# Patient Record
Sex: FEMALE | Race: WHITE | NOT HISPANIC OR LATINO
[De-identification: names, ages, dates, MRNs, and addresses within clinical notes are randomized per-mention and may not be internally consistent; named-entity substitution may affect disease eponyms.]

---

## 2017-01-03 ENCOUNTER — APPOINTMENT (OUTPATIENT)
Dept: GERIATRICS | Facility: CLINIC | Age: 72
End: 2017-01-03

## 2017-01-03 VITALS
DIASTOLIC BLOOD PRESSURE: 74 MMHG | OXYGEN SATURATION: 98 % | HEART RATE: 76 BPM | RESPIRATION RATE: 17 BRPM | HEIGHT: 64 IN | SYSTOLIC BLOOD PRESSURE: 140 MMHG | WEIGHT: 171 LBS | TEMPERATURE: 98.8 F | BODY MASS INDEX: 29.19 KG/M2

## 2017-01-05 LAB
25(OH)D3 SERPL-MCNC: 23.3 NG/ML
ALBUMIN SERPL ELPH-MCNC: 4.2 G/DL
ALP BLD-CCNC: 82 U/L
ALT SERPL-CCNC: 11 U/L
ANION GAP SERPL CALC-SCNC: 16 MMOL/L
AST SERPL-CCNC: 16 U/L
BASOPHILS # BLD AUTO: 0.02 K/UL
BASOPHILS NFR BLD AUTO: 0.2 %
BILIRUB SERPL-MCNC: 0.4 MG/DL
BUN SERPL-MCNC: 19 MG/DL
CALCIUM SERPL-MCNC: 9.7 MG/DL
CHLORIDE SERPL-SCNC: 105 MMOL/L
CHOLEST SERPL-MCNC: 328 MG/DL
CHOLEST/HDLC SERPL: 6.7 RATIO
CO2 SERPL-SCNC: 24 MMOL/L
CREAT SERPL-MCNC: 1.19 MG/DL
EOSINOPHIL # BLD AUTO: 0.3 K/UL
EOSINOPHIL NFR BLD AUTO: 3.6 %
FOLATE SERPL-MCNC: >20 NG/ML
GLUCOSE SERPL-MCNC: 86 MG/DL
HCT VFR BLD CALC: 40.2 %
HDLC SERPL-MCNC: 49 MG/DL
HGB BLD-MCNC: 13 G/DL
IMM GRANULOCYTES NFR BLD AUTO: 0.2 %
LDLC SERPL CALC-MCNC: 247 MG/DL
LYMPHOCYTES # BLD AUTO: 2.21 K/UL
LYMPHOCYTES NFR BLD AUTO: 26.3 %
MAN DIFF?: NORMAL
MCHC RBC-ENTMCNC: 30.7 PG
MCHC RBC-ENTMCNC: 32.3 GM/DL
MCV RBC AUTO: 94.8 FL
MONOCYTES # BLD AUTO: 0.74 K/UL
MONOCYTES NFR BLD AUTO: 8.8 %
NEUTROPHILS # BLD AUTO: 5.11 K/UL
NEUTROPHILS NFR BLD AUTO: 60.9 %
PLATELET # BLD AUTO: 297 K/UL
POTASSIUM SERPL-SCNC: 4.4 MMOL/L
PROT SERPL-MCNC: 7.1 G/DL
RBC # BLD: 4.24 M/UL
RBC # FLD: 14.7 %
SODIUM SERPL-SCNC: 145 MMOL/L
TRIGL SERPL-MCNC: 159 MG/DL
TSH SERPL-ACNC: 1.35 UIU/ML
VIT B12 SERPL-MCNC: 445 PG/ML
WBC # FLD AUTO: 8.4 K/UL

## 2017-03-16 ENCOUNTER — APPOINTMENT (OUTPATIENT)
Dept: GERIATRICS | Facility: CLINIC | Age: 72
End: 2017-03-16

## 2017-03-16 VITALS
OXYGEN SATURATION: 96 % | DIASTOLIC BLOOD PRESSURE: 70 MMHG | HEIGHT: 64 IN | SYSTOLIC BLOOD PRESSURE: 120 MMHG | WEIGHT: 174 LBS | TEMPERATURE: 97.9 F | HEART RATE: 72 BPM | RESPIRATION RATE: 16 BRPM | BODY MASS INDEX: 29.71 KG/M2

## 2017-05-02 ENCOUNTER — RX RENEWAL (OUTPATIENT)
Age: 72
End: 2017-05-02

## 2017-05-30 ENCOUNTER — APPOINTMENT (OUTPATIENT)
Dept: GERIATRICS | Facility: CLINIC | Age: 72
End: 2017-05-30

## 2017-05-30 VITALS
WEIGHT: 181 LBS | RESPIRATION RATE: 16 BRPM | DIASTOLIC BLOOD PRESSURE: 58 MMHG | BODY MASS INDEX: 30.9 KG/M2 | TEMPERATURE: 98.3 F | SYSTOLIC BLOOD PRESSURE: 110 MMHG | HEIGHT: 64 IN | OXYGEN SATURATION: 97 % | HEART RATE: 70 BPM

## 2017-05-30 DIAGNOSIS — Z23 ENCOUNTER FOR IMMUNIZATION: ICD-10-CM

## 2017-05-30 DIAGNOSIS — Z63.4 ADJUSTMENT DISORDER, UNSPECIFIED: ICD-10-CM

## 2017-05-30 DIAGNOSIS — Z86.19 PERSONAL HISTORY OF OTHER INFECTIOUS AND PARASITIC DISEASES: ICD-10-CM

## 2017-05-30 DIAGNOSIS — F43.20 ADJUSTMENT DISORDER, UNSPECIFIED: ICD-10-CM

## 2017-05-30 RX ORDER — ALPRAZOLAM 0.25 MG/1
0.25 TABLET ORAL
Qty: 30 | Refills: 0 | Status: DISCONTINUED | COMMUNITY
Start: 2017-01-03 | End: 2017-05-30

## 2017-05-30 SDOH — SOCIAL STABILITY - SOCIAL INSECURITY: DISSAPEARANCE AND DEATH OF FAMILY MEMBER: Z63.4

## 2017-06-02 LAB
25(OH)D3 SERPL-MCNC: 24.1 NG/ML
CHOLEST SERPL-MCNC: 232 MG/DL
CHOLEST/HDLC SERPL: 4.5 RATIO
HDLC SERPL-MCNC: 52 MG/DL
LDLC SERPL CALC-MCNC: NORMAL
TRIGL SERPL-MCNC: 481 MG/DL

## 2017-06-08 ENCOUNTER — RX RENEWAL (OUTPATIENT)
Age: 72
End: 2017-06-08

## 2017-07-11 ENCOUNTER — RX RENEWAL (OUTPATIENT)
Age: 72
End: 2017-07-11

## 2017-08-08 ENCOUNTER — RX RENEWAL (OUTPATIENT)
Age: 72
End: 2017-08-08

## 2017-08-10 ENCOUNTER — RX RENEWAL (OUTPATIENT)
Age: 72
End: 2017-08-10

## 2017-08-11 ENCOUNTER — RX RENEWAL (OUTPATIENT)
Age: 72
End: 2017-08-11

## 2017-09-12 ENCOUNTER — RX RENEWAL (OUTPATIENT)
Age: 72
End: 2017-09-12

## 2017-09-26 ENCOUNTER — APPOINTMENT (OUTPATIENT)
Dept: GERIATRICS | Facility: CLINIC | Age: 72
End: 2017-09-26
Payer: MEDICARE

## 2017-09-26 VITALS
TEMPERATURE: 98.7 F | BODY MASS INDEX: 31.28 KG/M2 | SYSTOLIC BLOOD PRESSURE: 120 MMHG | HEART RATE: 69 BPM | WEIGHT: 183.25 LBS | DIASTOLIC BLOOD PRESSURE: 60 MMHG | OXYGEN SATURATION: 96 % | RESPIRATION RATE: 16 BRPM | HEIGHT: 64 IN

## 2017-09-26 DIAGNOSIS — Z12.31 ENCOUNTER FOR SCREENING MAMMOGRAM FOR MALIGNANT NEOPLASM OF BREAST: ICD-10-CM

## 2017-09-26 PROCEDURE — 99214 OFFICE O/P EST MOD 30 MIN: CPT | Mod: 25,GC

## 2017-09-26 PROCEDURE — G0008: CPT

## 2017-09-26 PROCEDURE — 90662 IIV NO PRSV INCREASED AG IM: CPT

## 2017-09-26 RX ORDER — PREDNISONE 5 MG/1
5 TABLET ORAL DAILY
Qty: 30 | Refills: 0 | Status: DISCONTINUED | COMMUNITY
Start: 2017-03-16 | End: 2017-09-26

## 2017-11-02 ENCOUNTER — RX RENEWAL (OUTPATIENT)
Age: 72
End: 2017-11-02

## 2017-11-15 ENCOUNTER — MEDICATION RENEWAL (OUTPATIENT)
Age: 72
End: 2017-11-15

## 2017-12-12 ENCOUNTER — APPOINTMENT (OUTPATIENT)
Dept: GERIATRICS | Facility: CLINIC | Age: 72
End: 2017-12-12
Payer: MEDICARE

## 2017-12-12 VITALS
TEMPERATURE: 98.8 F | HEART RATE: 65 BPM | OXYGEN SATURATION: 98 % | RESPIRATION RATE: 15 BRPM | HEIGHT: 64 IN | BODY MASS INDEX: 31.41 KG/M2 | DIASTOLIC BLOOD PRESSURE: 70 MMHG | WEIGHT: 184 LBS | SYSTOLIC BLOOD PRESSURE: 130 MMHG

## 2017-12-12 PROCEDURE — 99215 OFFICE O/P EST HI 40 MIN: CPT | Mod: 25,GC

## 2017-12-12 PROCEDURE — G0444 DEPRESSION SCREEN ANNUAL: CPT | Mod: 59

## 2018-01-01 ENCOUNTER — EMERGENCY (EMERGENCY)
Facility: HOSPITAL | Age: 73
LOS: 1 days | Discharge: HOME | End: 2018-01-01
Attending: EMERGENCY MEDICINE | Admitting: EMERGENCY MEDICINE
Payer: MEDICARE

## 2018-01-01 VITALS
SYSTOLIC BLOOD PRESSURE: 178 MMHG | OXYGEN SATURATION: 98 % | TEMPERATURE: 99 F | RESPIRATION RATE: 17 BRPM | HEART RATE: 89 BPM | DIASTOLIC BLOOD PRESSURE: 80 MMHG

## 2018-01-01 DIAGNOSIS — I82.402 ACUTE EMBOLISM AND THROMBOSIS OF UNSPECIFIED DEEP VEINS OF LEFT LOWER EXTREMITY: ICD-10-CM

## 2018-01-01 DIAGNOSIS — I10 ESSENTIAL (PRIMARY) HYPERTENSION: ICD-10-CM

## 2018-01-01 DIAGNOSIS — R52 PAIN, UNSPECIFIED: ICD-10-CM

## 2018-01-01 DIAGNOSIS — I82.4Y2 ACUTE EMBOLISM AND THROMBOSIS OF UNSPECIFIED DEEP VEINS OF LEFT PROXIMAL LOWER EXTREMITY: ICD-10-CM

## 2018-01-01 DIAGNOSIS — G35 MULTIPLE SCLEROSIS: ICD-10-CM

## 2018-01-01 DIAGNOSIS — N17.9 ACUTE KIDNEY FAILURE, UNSPECIFIED: ICD-10-CM

## 2018-01-01 DIAGNOSIS — R60.0 LOCALIZED EDEMA: ICD-10-CM

## 2018-01-01 DIAGNOSIS — Z79.899 OTHER LONG TERM (CURRENT) DRUG THERAPY: ICD-10-CM

## 2018-01-01 DIAGNOSIS — Z29.9 ENCOUNTER FOR PROPHYLACTIC MEASURES, UNSPECIFIED: ICD-10-CM

## 2018-01-01 DIAGNOSIS — I82.409 ACUTE EMBOLISM AND THROMBOSIS OF UNSPECIFIED DEEP VEINS OF UNSPECIFIED LOWER EXTREMITY: ICD-10-CM

## 2018-01-01 DIAGNOSIS — Z79.52 LONG TERM (CURRENT) USE OF SYSTEMIC STEROIDS: ICD-10-CM

## 2018-01-01 LAB
ALBUMIN SERPL ELPH-MCNC: 3.7 G/DL — SIGNIFICANT CHANGE UP (ref 3.3–5)
ALP SERPL-CCNC: 65 U/L — SIGNIFICANT CHANGE UP (ref 40–120)
ALT FLD-CCNC: 22 U/L RC — SIGNIFICANT CHANGE UP (ref 10–45)
ANION GAP SERPL CALC-SCNC: 13 MMOL/L — SIGNIFICANT CHANGE UP (ref 5–17)
APTT BLD: 24.2 SEC — LOW (ref 27.5–37.4)
AST SERPL-CCNC: 20 U/L — SIGNIFICANT CHANGE UP (ref 10–40)
BASE EXCESS BLDV CALC-SCNC: 1 MMOL/L — SIGNIFICANT CHANGE UP (ref -2–2)
BASOPHILS # BLD AUTO: 0 K/UL — SIGNIFICANT CHANGE UP (ref 0–0.2)
BASOPHILS NFR BLD AUTO: 0.2 % — SIGNIFICANT CHANGE UP (ref 0–2)
BILIRUB SERPL-MCNC: 0.4 MG/DL — SIGNIFICANT CHANGE UP (ref 0.2–1.2)
BUN SERPL-MCNC: 21 MG/DL — SIGNIFICANT CHANGE UP (ref 7–23)
CA-I SERPL-SCNC: 1.16 MMOL/L — SIGNIFICANT CHANGE UP (ref 1.12–1.3)
CALCIUM SERPL-MCNC: 9.1 MG/DL — SIGNIFICANT CHANGE UP (ref 8.4–10.5)
CHLORIDE BLDV-SCNC: 114 MMOL/L — HIGH (ref 96–108)
CHLORIDE SERPL-SCNC: 108 MMOL/L — SIGNIFICANT CHANGE UP (ref 96–108)
CO2 BLDV-SCNC: 27 MMOL/L — SIGNIFICANT CHANGE UP (ref 22–30)
CO2 SERPL-SCNC: 25 MMOL/L — SIGNIFICANT CHANGE UP (ref 22–31)
CREAT SERPL-MCNC: 1.39 MG/DL — HIGH (ref 0.5–1.3)
EOSINOPHIL # BLD AUTO: 0.3 K/UL — SIGNIFICANT CHANGE UP (ref 0–0.5)
EOSINOPHIL NFR BLD AUTO: 3.4 % — SIGNIFICANT CHANGE UP (ref 0–6)
GAS PNL BLDV: 141 MMOL/L — SIGNIFICANT CHANGE UP (ref 136–145)
GAS PNL BLDV: SIGNIFICANT CHANGE UP
GAS PNL BLDV: SIGNIFICANT CHANGE UP
GLUCOSE BLDV-MCNC: 98 MG/DL — SIGNIFICANT CHANGE UP (ref 70–99)
GLUCOSE SERPL-MCNC: 96 MG/DL — SIGNIFICANT CHANGE UP (ref 70–99)
HCO3 BLDV-SCNC: 26 MMOL/L — SIGNIFICANT CHANGE UP (ref 21–29)
HCT VFR BLD CALC: 38.1 % — SIGNIFICANT CHANGE UP (ref 34.5–45)
HCT VFR BLDA CALC: 41 % — SIGNIFICANT CHANGE UP (ref 39–50)
HGB BLD CALC-MCNC: 13.4 G/DL — SIGNIFICANT CHANGE UP (ref 11.5–15.5)
HGB BLD-MCNC: 12.8 G/DL — SIGNIFICANT CHANGE UP (ref 11.5–15.5)
HOROWITZ INDEX BLDV+IHG-RTO: SIGNIFICANT CHANGE UP
INR BLD: 1.08 RATIO — SIGNIFICANT CHANGE UP (ref 0.88–1.16)
LACTATE BLDV-MCNC: 2 MMOL/L — SIGNIFICANT CHANGE UP (ref 0.7–2)
LYMPHOCYTES # BLD AUTO: 0.8 K/UL — LOW (ref 1–3.3)
LYMPHOCYTES # BLD AUTO: 8.4 % — LOW (ref 13–44)
MCHC RBC-ENTMCNC: 32.2 PG — SIGNIFICANT CHANGE UP (ref 27–34)
MCHC RBC-ENTMCNC: 33.5 GM/DL — SIGNIFICANT CHANGE UP (ref 32–36)
MCV RBC AUTO: 96.2 FL — SIGNIFICANT CHANGE UP (ref 80–100)
MONOCYTES # BLD AUTO: 0.8 K/UL — SIGNIFICANT CHANGE UP (ref 0–0.9)
MONOCYTES NFR BLD AUTO: 8.6 % — SIGNIFICANT CHANGE UP (ref 2–14)
NEUTROPHILS # BLD AUTO: 7.3 K/UL — SIGNIFICANT CHANGE UP (ref 1.8–7.4)
NEUTROPHILS NFR BLD AUTO: 79.5 % — HIGH (ref 43–77)
PCO2 BLDV: 44 MMHG — SIGNIFICANT CHANGE UP (ref 35–50)
PH BLDV: 7.39 — SIGNIFICANT CHANGE UP (ref 7.35–7.45)
PLATELET # BLD AUTO: 222 K/UL — SIGNIFICANT CHANGE UP (ref 150–400)
PO2 BLDV: 32 MMHG — SIGNIFICANT CHANGE UP (ref 25–45)
POTASSIUM BLDV-SCNC: 5.1 MMOL/L — HIGH (ref 3.5–5)
POTASSIUM SERPL-MCNC: 4.5 MMOL/L — SIGNIFICANT CHANGE UP (ref 3.5–5.3)
POTASSIUM SERPL-SCNC: 4.5 MMOL/L — SIGNIFICANT CHANGE UP (ref 3.5–5.3)
PROT SERPL-MCNC: 7.2 G/DL — SIGNIFICANT CHANGE UP (ref 6–8.3)
PROTHROM AB SERPL-ACNC: 11.8 SEC — SIGNIFICANT CHANGE UP (ref 9.8–12.7)
RBC # BLD: 3.96 M/UL — SIGNIFICANT CHANGE UP (ref 3.8–5.2)
RBC # FLD: 13.1 % — SIGNIFICANT CHANGE UP (ref 10.3–14.5)
SAO2 % BLDV: 50 % — LOW (ref 67–88)
SODIUM SERPL-SCNC: 146 MMOL/L — HIGH (ref 135–145)
WBC # BLD: 9.1 K/UL — SIGNIFICANT CHANGE UP (ref 3.8–10.5)
WBC # FLD AUTO: 9.1 K/UL — SIGNIFICANT CHANGE UP (ref 3.8–10.5)

## 2018-01-01 RX ORDER — HEPARIN SODIUM 5000 [USP'U]/ML
INJECTION INTRAVENOUS; SUBCUTANEOUS
Qty: 25000 | Refills: 0 | Status: DISCONTINUED | OUTPATIENT
Start: 2018-01-01 | End: 2018-01-01

## 2018-01-01 RX ORDER — HEPARIN SODIUM 5000 [USP'U]/ML
6500 INJECTION INTRAVENOUS; SUBCUTANEOUS EVERY 6 HOURS
Qty: 0 | Refills: 0 | Status: DISCONTINUED | OUTPATIENT
Start: 2018-01-01 | End: 2018-01-02

## 2018-01-01 RX ORDER — OXYCODONE AND ACETAMINOPHEN 5; 325 MG/1; MG/1
1 TABLET ORAL ONCE
Qty: 0 | Refills: 0 | Status: DISCONTINUED | OUTPATIENT
Start: 2018-01-01 | End: 2018-01-01

## 2018-01-01 RX ORDER — CHOLECALCIFEROL (VITAMIN D3) 125 MCG
1000 CAPSULE ORAL DAILY
Qty: 0 | Refills: 0 | Status: DISCONTINUED | OUTPATIENT
Start: 2018-01-01 | End: 2018-01-02

## 2018-01-01 RX ORDER — SENNA PLUS 8.6 MG/1
2 TABLET ORAL AT BEDTIME
Qty: 0 | Refills: 0 | Status: DISCONTINUED | OUTPATIENT
Start: 2018-01-01 | End: 2018-01-02

## 2018-01-01 RX ORDER — OXYCODONE AND ACETAMINOPHEN 5; 325 MG/1; MG/1
1 TABLET ORAL EVERY 4 HOURS
Qty: 0 | Refills: 0 | Status: DISCONTINUED | OUTPATIENT
Start: 2018-01-01 | End: 2018-01-02

## 2018-01-01 RX ORDER — DOCUSATE SODIUM 100 MG
100 CAPSULE ORAL THREE TIMES A DAY
Qty: 0 | Refills: 0 | Status: DISCONTINUED | OUTPATIENT
Start: 2018-01-01 | End: 2018-01-02

## 2018-01-01 RX ORDER — HEPARIN SODIUM 5000 [USP'U]/ML
6500 INJECTION INTRAVENOUS; SUBCUTANEOUS EVERY 6 HOURS
Qty: 0 | Refills: 0 | Status: DISCONTINUED | OUTPATIENT
Start: 2018-01-01 | End: 2018-01-01

## 2018-01-01 RX ORDER — MORPHINE SULFATE 50 MG/1
2 CAPSULE, EXTENDED RELEASE ORAL EVERY 6 HOURS
Qty: 0 | Refills: 0 | Status: DISCONTINUED | OUTPATIENT
Start: 2018-01-01 | End: 2018-01-02

## 2018-01-01 RX ORDER — ACETAMINOPHEN 500 MG
650 TABLET ORAL EVERY 6 HOURS
Qty: 0 | Refills: 0 | Status: DISCONTINUED | OUTPATIENT
Start: 2018-01-01 | End: 2018-01-02

## 2018-01-01 RX ORDER — HEPARIN SODIUM 5000 [USP'U]/ML
6500 INJECTION INTRAVENOUS; SUBCUTANEOUS ONCE
Qty: 0 | Refills: 0 | Status: DISCONTINUED | OUTPATIENT
Start: 2018-01-01 | End: 2018-01-01

## 2018-01-01 RX ORDER — HEPARIN SODIUM 5000 [USP'U]/ML
6500 INJECTION INTRAVENOUS; SUBCUTANEOUS ONCE
Qty: 0 | Refills: 0 | Status: COMPLETED | OUTPATIENT
Start: 2018-01-01 | End: 2018-01-01

## 2018-01-01 RX ORDER — HEPARIN SODIUM 5000 [USP'U]/ML
3000 INJECTION INTRAVENOUS; SUBCUTANEOUS EVERY 6 HOURS
Qty: 0 | Refills: 0 | Status: DISCONTINUED | OUTPATIENT
Start: 2018-01-01 | End: 2018-01-01

## 2018-01-01 RX ORDER — HEPARIN SODIUM 5000 [USP'U]/ML
3000 INJECTION INTRAVENOUS; SUBCUTANEOUS EVERY 6 HOURS
Qty: 0 | Refills: 0 | Status: DISCONTINUED | OUTPATIENT
Start: 2018-01-01 | End: 2018-01-02

## 2018-01-01 RX ORDER — HEPARIN SODIUM 5000 [USP'U]/ML
INJECTION INTRAVENOUS; SUBCUTANEOUS
Qty: 25000 | Refills: 0 | Status: DISCONTINUED | OUTPATIENT
Start: 2018-01-01 | End: 2018-01-02

## 2018-01-01 RX ORDER — METOPROLOL TARTRATE 50 MG
50 TABLET ORAL DAILY
Qty: 0 | Refills: 0 | Status: DISCONTINUED | OUTPATIENT
Start: 2018-01-01 | End: 2018-01-02

## 2018-01-01 RX ADMIN — Medication 50 MILLIGRAM(S): at 21:59

## 2018-01-01 RX ADMIN — OXYCODONE AND ACETAMINOPHEN 1 TABLET(S): 5; 325 TABLET ORAL at 13:00

## 2018-01-01 RX ADMIN — OXYCODONE AND ACETAMINOPHEN 1 TABLET(S): 5; 325 TABLET ORAL at 17:54

## 2018-01-01 RX ADMIN — HEPARIN SODIUM 6500 UNIT(S): 5000 INJECTION INTRAVENOUS; SUBCUTANEOUS at 18:03

## 2018-01-01 RX ADMIN — Medication 100 MILLIGRAM(S): at 21:59

## 2018-01-01 RX ADMIN — OXYCODONE AND ACETAMINOPHEN 1 TABLET(S): 5; 325 TABLET ORAL at 12:34

## 2018-01-01 RX ADMIN — HEPARIN SODIUM 1500 UNIT(S)/HR: 5000 INJECTION INTRAVENOUS; SUBCUTANEOUS at 18:04

## 2018-01-01 NOTE — ED PROVIDER NOTE - OBJECTIVE STATEMENT
72 year old female w/ PMH of HTN, MS presents c/o LLE edema and pain since this morning. Patient w/ DVT 30 years ago, not on any anti-coagulation. States that when she woke up noticed that swelling associated w/ 10/10 pain w/ ambulation and chills. Patient denies recent travel, hormonal therapy, fevers, chest pain, sob, abdominal pain, n/v.

## 2018-01-01 NOTE — H&P ADULT - PROBLEM SELECTOR PLAN 5
on glatimer acetate injections MWF  - check vit d level; start vit d 1000 iu daily as vit d shown to decr MS relapses  - If pt is still in hospital Wednesday, would need pt to self-inject  - c/w home prednisone 10 mg qd (has been on for 6 weeks) on glatimer acetate injections MWF  - check vit d level; start vit d 1000 iu daily as vit d shown to decr MS relapses  - If pt is still in hospital Wednesday, would need pt to self-inject glatimer  - c/w home prednisone 10 mg qd (has been on for 6 weeks)

## 2018-01-01 NOTE — H&P ADULT - PROBLEM SELECTOR PLAN 4
elevated BP read in ED, with hx of HTN. May be 2/2 pain.  - recheck BP  - c/w home toprol XL 50 mg once daily

## 2018-01-01 NOTE — ED PROVIDER NOTE - PROGRESS NOTE DETAILS
Spoke to Dr. Love fellow who states patient Spoke to Dr. Love fellow regarding findings. States that patient should be admitted for inpatient anti-coagulation. Patient w/ recent abnl protein electrophoresis, requests that heme be consulted as an inpatient  Penny Payne PA-C

## 2018-01-01 NOTE — ED PROVIDER NOTE - ATTENDING CONTRIBUTION TO CARE
72 year old female w/ PMH of HTN, MS presents c/o LLE edema and pain since this morning. Patient w/ DVT 30 years ago with swelling noted and warmth started today with no trauma noted. pos chills. vss, leg with swelling but no skin changes lower leg.

## 2018-01-01 NOTE — H&P ADULT - PROBLEM SELECTOR PLAN 3
Cr 1.4 - CHADWICK vs underlying CKD.  - obtain baseline Cr from outpt office  - monitor bmp  - renally dose meds

## 2018-01-01 NOTE — H&P ADULT - PROBLEM SELECTOR PLAN 6
DVT tx as above  PT/OT consult  DISPO: likely d/c tomorrow 1/2 pending heme recs, pharmacy confirmation of oral a/c  PCP: Dr Marino emailed regarding admission.

## 2018-01-01 NOTE — H&P ADULT - PROBLEM SELECTOR PLAN 2
Chronic pain with opioid dependence. ISTOP verified home meds #: [id number].  - c/w home lyrica 50 mg q12h  - c/w home percocet 1 tab q8h prn mod pain  - morphine 2 mg iv q6h prn severe pain given LLE pain  - bowel regimen (senna/colace) while on opioids

## 2018-01-01 NOTE — H&P ADULT - FAMILY HISTORY
Mother  Still living? No  Family history of DVT, Age at diagnosis: Age Unknown     Father  Still living? No  Family history of heart disease, Age at diagnosis: Age Unknown

## 2018-01-01 NOTE — H&P ADULT - PMH
Acute deep vein thrombosis (DVT) of lower extremity, unspecified laterality, unspecified vein  30 YEARS AGO  HTN (hypertension)    MS (multiple sclerosis)

## 2018-01-01 NOTE — H&P ADULT - PROBLEM SELECTOR PLAN 1
of LLE, confirmed by doppler. No known inciting factor although does report exercise tolerance limited 2/2 chronic pain and MS. UTD with malignancy screening, however recent protein electrophoresis abn.  - start heparin gtt given CHADWICK; monitor PTT  - called pharmacy [facility] in [place] [phone] CLOSED today. Would f/u tomorrow regarding if apixaban covered by insurance as would be good outpt regimen in setting of elevated Cr.  - no respiratory distress - would not check CTA as won't   - f/u outpt records regarding protein electrophoresis  - check spep/upep  - consult hematology in the AM as this is 2nd DVT (nonprovoked) and to consider testing for coagulopathy (antithrombin, prothrombin, factor v leidin, antiphospholipid, protein c and s)  - pain control as below

## 2018-01-01 NOTE — H&P ADULT - NSHPREVIEWOFSYSTEMS_GEN_ALL_CORE
Review of Systems:   CONSTITUTIONAL: +CHILLS. No weight loss  EYES: No eye pain, visual disturbances, or discharge  ENMT:  No difficulty hearing, tinnitus, vertigo; No sinus or throat pain  RESPIRATORY: No SOB. No cough, wheezing, chills or hemoptysis  CARDIOVASCULAR: +LEFT LEG SWELLING. No chest pain, palpitations, dizziness.  GASTROINTESTINAL: No abdominal or epigastric pain. No nausea, vomiting, or hematemesis; No diarrhea or constipation. No melena or hematochezia.  GENITOURINARY: No dysuria, frequency, hematuria, or incontinence  NEUROLOGICAL: No headaches, memory loss, loss of strength, numbness, or tremors  SKIN: +REDNESS OF LEFT LEG. No itching, burning, lesions   LYMPH NODES: No enlarged glands  ENDOCRINE: No heat or cold intolerance; No hair loss  MUSCULOSKELETAL: No joint pain or swelling  PSYCHIATRIC: No depression, anxiety, mood swings, or difficulty sleeping  HEME/LYMPH: No easy bruising, or bleeding gums

## 2018-01-01 NOTE — ED PROVIDER NOTE - PHYSICAL EXAMINATION
CONSTITUTIONAL: Patient is awake, alert and oriented x 3. Patient is well appearing and in no acute distress.  HEAD: NCAT,   NECK: supple, No LAD,  LUNGS: CTA B/L,  HEART: RRR.+S1S2 no murmurs,   ABDOMEN: Soft nd/nt+bs no rebound or guarding.   EXTREMITY: Excorations to the skin of the lle, +non-pitting edema to the lle with calf tenderness.  FROM upper and lower ext b/l  NEURO: No focal deficits

## 2018-01-01 NOTE — H&P ADULT - NSHPPHYSICALEXAM_GEN_ALL_CORE
Vital Signs Last 24 Hrs  T(C): 37.1 (01 Jan 2018 10:40), Max: 37.1 (01 Jan 2018 10:40)  T(F): 98.7 (01 Jan 2018 10:40), Max: 98.7 (01 Jan 2018 10:40)  HR: 89 (01 Jan 2018 10:40) (89 - 89)  BP: 178/80 (01 Jan 2018 10:40) (178/80 - 178/80)  BP(mean): --  RR: 17 (01 Jan 2018 10:40) (17 - 17)  SpO2: 98% (01 Jan 2018 10:40) (98% - 98%)    PHYSICAL EXAM:  GENERAL:  Well appearing, obese F, lying in stretcher, in NAD  HEAD:  NCAT  EYES: PERRLA  NECK: Supple, No JVD  CHEST/LUNG: CTA B/L. No w/r/r.  HEART: RRR. Normal S1, S2. No m/r/g.   ABDOMEN: SNTND. Bowel sounds present  EXTREMITIES:  LLE enlarged w/ 1+ pitting edema, TTP, with associated erythematous vertical striae. 2+ Peripheral Pulses, No clubbing, cyanosis.  PSYCH: AAOx3, appropriate affect  SKIN: striae distensae of LLE

## 2018-01-01 NOTE — ED ADULT NURSE NOTE - CHPI ED SYMPTOMS NEG
no nausea/no tingling/no vomiting/no dizziness/no chills/no fever/no weakness/no numbness/no decreased eating/drinking

## 2018-01-01 NOTE — ED ADULT NURSE REASSESSMENT NOTE - NS ED NURSE REASSESS COMMENT FT1
Attempted to give report to 3 cheri RN. person answering phone states "room is dirty", placed me on hold for 7 minutes waiting for RN, picked up and then hung up. Charge RN aware.

## 2018-01-01 NOTE — ED ADULT NURSE NOTE - OBJECTIVE STATEMENT
72 year old female presents to ED with pain and swelling in left lower extremity. History of MS & HTN. states pain and swelling came on suddenly last night. Denies HA, CP, SOB, abd pain, NVD, fevers, chills, dizziness, cough, recent falls or travel. Leg is swollen, red. Patient undressed and placed into gown, call bell in hand and side rails up with bed in lowest position for safety. blanket provided. Comfort and safety provided.

## 2018-01-01 NOTE — H&P ADULT - ASSESSMENT
71 y/o F PMH of HTN, multiple sclerosis on glatimer acetate, chronic pain on opioids, Hx of provoked DVT 30 yrs ago completed coumadin presents for 1 day of LLE edema/pain found to have acute LLE femoral DVT. Noted to have elevated Cr.

## 2018-01-01 NOTE — H&P ADULT - NSHPLABSRESULTS_GEN_ALL_CORE
LABS:                         12.8   9.1   )-----------( 222      ( 01 Jan 2018 12:01 )             38.1     01-01    146<H>  |  108  |  21  ----------------------------<  96  4.5   |  25  |  1.39<H>    Ca    9.1      01 Jan 2018 12:01    TPro  7.2  /  Alb  3.7  /  TBili  0.4  /  DBili  x   /  AST  20  /  ALT  22  /  AlkPhos  65  01-01    PT/INR - ( 01 Jan 2018 12:01 )   PT: 11.8 sec;   INR: 1.08 ratio         PTT - ( 01 Jan 2018 12:01 )  PTT:24.2 sec    Labs reviewed remarkable for Cr 1.39, none prior to compare. Mild hyperNa.  Doppler of LLE - acute LLE DVT from mid femoral vein through popliteal and calf vein.

## 2018-01-02 ENCOUNTER — TRANSCRIPTION ENCOUNTER (OUTPATIENT)
Age: 73
End: 2018-01-02

## 2018-01-02 VITALS
DIASTOLIC BLOOD PRESSURE: 89 MMHG | SYSTOLIC BLOOD PRESSURE: 141 MMHG | OXYGEN SATURATION: 98 % | TEMPERATURE: 98 F | HEART RATE: 72 BPM | RESPIRATION RATE: 18 BRPM

## 2018-01-02 LAB
24R-OH-CALCIDIOL SERPL-MCNC: 34.8 NG/ML — SIGNIFICANT CHANGE UP (ref 30–80)
ANION GAP SERPL CALC-SCNC: 13 MMOL/L — SIGNIFICANT CHANGE UP (ref 5–17)
APTT BLD: 159.2 SEC — CRITICAL HIGH (ref 27.5–37.4)
APTT BLD: 91.5 SEC — HIGH (ref 27.5–37.4)
BASOPHILS # BLD AUTO: 0.03 K/UL — SIGNIFICANT CHANGE UP (ref 0–0.2)
BASOPHILS NFR BLD AUTO: 0.3 % — SIGNIFICANT CHANGE UP (ref 0–2)
BUN SERPL-MCNC: 21 MG/DL — SIGNIFICANT CHANGE UP (ref 7–23)
CALCIUM SERPL-MCNC: 9 MG/DL — SIGNIFICANT CHANGE UP (ref 8.4–10.5)
CHLORIDE SERPL-SCNC: 106 MMOL/L — SIGNIFICANT CHANGE UP (ref 96–108)
CO2 SERPL-SCNC: 26 MMOL/L — SIGNIFICANT CHANGE UP (ref 22–31)
CREAT SERPL-MCNC: 1.25 MG/DL — SIGNIFICANT CHANGE UP (ref 0.5–1.3)
EOSINOPHIL # BLD AUTO: 0.66 K/UL — HIGH (ref 0–0.5)
EOSINOPHIL NFR BLD AUTO: 6.7 % — HIGH (ref 0–6)
GLUCOSE SERPL-MCNC: 87 MG/DL — SIGNIFICANT CHANGE UP (ref 70–99)
HCT VFR BLD CALC: 34.8 % — SIGNIFICANT CHANGE UP (ref 34.5–45)
HCT VFR BLD CALC: 36.7 % — SIGNIFICANT CHANGE UP (ref 34.5–45)
HGB BLD-MCNC: 11.7 G/DL — SIGNIFICANT CHANGE UP (ref 11.5–15.5)
HGB BLD-MCNC: 11.9 G/DL — SIGNIFICANT CHANGE UP (ref 11.5–15.5)
IMM GRANULOCYTES NFR BLD AUTO: 0.3 % — SIGNIFICANT CHANGE UP (ref 0–1.5)
LYMPHOCYTES # BLD AUTO: 1.5 K/UL — SIGNIFICANT CHANGE UP (ref 1–3.3)
LYMPHOCYTES # BLD AUTO: 15.2 % — SIGNIFICANT CHANGE UP (ref 13–44)
MAGNESIUM SERPL-MCNC: 2.4 MG/DL — SIGNIFICANT CHANGE UP (ref 1.6–2.6)
MCHC RBC-ENTMCNC: 31.1 PG — SIGNIFICANT CHANGE UP (ref 27–34)
MCHC RBC-ENTMCNC: 32.3 PG — SIGNIFICANT CHANGE UP (ref 27–34)
MCHC RBC-ENTMCNC: 32.4 GM/DL — SIGNIFICANT CHANGE UP (ref 32–36)
MCHC RBC-ENTMCNC: 33.5 GM/DL — SIGNIFICANT CHANGE UP (ref 32–36)
MCV RBC AUTO: 95.8 FL — SIGNIFICANT CHANGE UP (ref 80–100)
MCV RBC AUTO: 96.6 FL — SIGNIFICANT CHANGE UP (ref 80–100)
MONOCYTES # BLD AUTO: 0.89 K/UL — SIGNIFICANT CHANGE UP (ref 0–0.9)
MONOCYTES NFR BLD AUTO: 9 % — SIGNIFICANT CHANGE UP (ref 2–14)
NEUTROPHILS # BLD AUTO: 6.74 K/UL — SIGNIFICANT CHANGE UP (ref 1.8–7.4)
NEUTROPHILS NFR BLD AUTO: 68.5 % — SIGNIFICANT CHANGE UP (ref 43–77)
PLATELET # BLD AUTO: 222 K/UL — SIGNIFICANT CHANGE UP (ref 150–400)
PLATELET # BLD AUTO: 256 K/UL — SIGNIFICANT CHANGE UP (ref 150–400)
POTASSIUM SERPL-MCNC: 4 MMOL/L — SIGNIFICANT CHANGE UP (ref 3.5–5.3)
POTASSIUM SERPL-SCNC: 4 MMOL/L — SIGNIFICANT CHANGE UP (ref 3.5–5.3)
PROT SERPL-MCNC: 6.2 G/DL — SIGNIFICANT CHANGE UP (ref 6–8.3)
PROT SERPL-MCNC: 6.2 G/DL — SIGNIFICANT CHANGE UP (ref 6–8.3)
RBC # BLD: 3.6 M/UL — LOW (ref 3.8–5.2)
RBC # BLD: 3.83 M/UL — SIGNIFICANT CHANGE UP (ref 3.8–5.2)
RBC # FLD: 13.2 % — SIGNIFICANT CHANGE UP (ref 10.3–14.5)
RBC # FLD: 14.9 % — HIGH (ref 10.3–14.5)
SODIUM SERPL-SCNC: 145 MMOL/L — SIGNIFICANT CHANGE UP (ref 135–145)
WBC # BLD: 10 K/UL — SIGNIFICANT CHANGE UP (ref 3.8–10.5)
WBC # BLD: 9.85 K/UL — SIGNIFICANT CHANGE UP (ref 3.8–10.5)
WBC # FLD AUTO: 10 K/UL — SIGNIFICANT CHANGE UP (ref 3.8–10.5)
WBC # FLD AUTO: 9.85 K/UL — SIGNIFICANT CHANGE UP (ref 3.8–10.5)

## 2018-01-02 PROCEDURE — 82306 VITAMIN D 25 HYDROXY: CPT

## 2018-01-02 PROCEDURE — 84155 ASSAY OF PROTEIN SERUM: CPT

## 2018-01-02 PROCEDURE — 82803 BLOOD GASES ANY COMBINATION: CPT

## 2018-01-02 PROCEDURE — 82330 ASSAY OF CALCIUM: CPT

## 2018-01-02 PROCEDURE — 80053 COMPREHEN METABOLIC PANEL: CPT

## 2018-01-02 PROCEDURE — 99285 EMERGENCY DEPT VISIT HI MDM: CPT | Mod: 25

## 2018-01-02 PROCEDURE — 85610 PROTHROMBIN TIME: CPT

## 2018-01-02 PROCEDURE — 83735 ASSAY OF MAGNESIUM: CPT

## 2018-01-02 PROCEDURE — 82947 ASSAY GLUCOSE BLOOD QUANT: CPT

## 2018-01-02 PROCEDURE — 85027 COMPLETE CBC AUTOMATED: CPT

## 2018-01-02 PROCEDURE — 85730 THROMBOPLASTIN TIME PARTIAL: CPT

## 2018-01-02 PROCEDURE — 86334 IMMUNOFIX E-PHORESIS SERUM: CPT

## 2018-01-02 PROCEDURE — 85014 HEMATOCRIT: CPT

## 2018-01-02 PROCEDURE — 80048 BASIC METABOLIC PNL TOTAL CA: CPT

## 2018-01-02 PROCEDURE — 84295 ASSAY OF SERUM SODIUM: CPT

## 2018-01-02 PROCEDURE — 84132 ASSAY OF SERUM POTASSIUM: CPT

## 2018-01-02 PROCEDURE — 82435 ASSAY OF BLOOD CHLORIDE: CPT

## 2018-01-02 PROCEDURE — 83605 ASSAY OF LACTIC ACID: CPT

## 2018-01-02 PROCEDURE — 93971 EXTREMITY STUDY: CPT

## 2018-01-02 PROCEDURE — 84165 PROTEIN E-PHORESIS SERUM: CPT

## 2018-01-02 RX ORDER — APIXABAN 2.5 MG/1
2 TABLET, FILM COATED ORAL
Qty: 28 | Refills: 0 | OUTPATIENT
Start: 2018-01-02 | End: 2018-01-08

## 2018-01-02 RX ORDER — CHOLECALCIFEROL (VITAMIN D3) 125 MCG
1000 CAPSULE ORAL
Qty: 0 | Refills: 0 | COMMUNITY
Start: 2018-01-02

## 2018-01-02 RX ADMIN — OXYCODONE AND ACETAMINOPHEN 1 TABLET(S): 5; 325 TABLET ORAL at 01:45

## 2018-01-02 RX ADMIN — Medication 100 MILLIGRAM(S): at 14:26

## 2018-01-02 RX ADMIN — Medication 10 MILLIGRAM(S): at 05:41

## 2018-01-02 RX ADMIN — HEPARIN SODIUM 1200 UNIT(S)/HR: 5000 INJECTION INTRAVENOUS; SUBCUTANEOUS at 01:40

## 2018-01-02 RX ADMIN — HEPARIN SODIUM 0 UNIT(S)/HR: 5000 INJECTION INTRAVENOUS; SUBCUTANEOUS at 00:36

## 2018-01-02 RX ADMIN — HEPARIN SODIUM 1200 UNIT(S)/HR: 5000 INJECTION INTRAVENOUS; SUBCUTANEOUS at 10:31

## 2018-01-02 RX ADMIN — Medication 50 MILLIGRAM(S): at 05:41

## 2018-01-02 RX ADMIN — OXYCODONE AND ACETAMINOPHEN 1 TABLET(S): 5; 325 TABLET ORAL at 11:13

## 2018-01-02 RX ADMIN — Medication 1000 UNIT(S): at 14:26

## 2018-01-02 RX ADMIN — OXYCODONE AND ACETAMINOPHEN 1 TABLET(S): 5; 325 TABLET ORAL at 02:15

## 2018-01-02 RX ADMIN — Medication 100 MILLIGRAM(S): at 05:41

## 2018-01-02 NOTE — PROGRESS NOTE ADULT - PROBLEM SELECTOR PLAN 2
Chronic pain with opioid dependence.   - c/w home lyrica 50 mg q12h  - c/w home percocet 1 tab q8h prn mod pain  - morphine 2 mg iv q6h prn severe pain given LLE pain  - bowel regimen (senna/colace) while on opioids

## 2018-01-02 NOTE — DISCHARGE NOTE ADULT - PATIENT PORTAL LINK FT
“You can access the [facility] Patient Portal, offered by [facility], by registering with the following website: [email or web address]”

## 2018-01-02 NOTE — DISCHARGE NOTE ADULT - HOSPITAL COURSE
md 71 y/o F PMH of HTN, multiple sclerosis on glatimer acetate, chronic pain on opioids, Hx of provoked DVT 30 yrs ago completed coumadin presents for acute onset LLE edema. Pt on 12/31/17  feeling some chills and woke up the day after with sudden onset left leg swelling 3x normal associated with pain with ambulation, and overlying redness. Pt was brought to the ER at [facility]. In the ER venous duplex was done and pt was found to have acute left leg DVT. Pt also was found to have acute on CKD, she was started on IV heparin drip. Pt is clinicaly improving. after dicussion with PCP and pharmacy Pt is discharge on eliquis 10 mg Q 12h for 7 day and 5 mg Q 12 h. Pt has been advise to follow up with her PCP.

## 2018-01-02 NOTE — DISCHARGE NOTE ADULT - PLAN OF CARE
continue with copaxone Tolerates anticoagulation/ free from active bleed Start with [id number] twice a day for 7 days   Follow up with your doctor to decrease dose after 7days to 5mg oral twice a day condition improved Low salt diet.  Activity as tolerated.  Take all medication as prescribed.  Follow up with your medical doctor for routine blood pressure monitoring at your next visit.  Notify your doctor if you have any of the following symptoms:   Dizziness, Lightheadedness, Blurry vision, Headache, Chest pain, Shortness of breath

## 2018-01-02 NOTE — PROGRESS NOTE ADULT - SUBJECTIVE AND OBJECTIVE BOX
Patient is a 72y old  Female who presents with a chief complaint of LLE swelling and pain (01 Jan 2018 17:49)      SUBJECTIVE / OVERNIGHT EVENTS: Pt is seen and examined at bedside alert oriented in NAD. pt states the pain has been improved as well as the swelling in the leg. no new overnight event as per nursinfg staff    ROS: as mention above   All other review of systems negative    Allergies    penicillin (Anaphylaxis)    Intolerances        MEDICATIONS  (STANDING):  cholecalciferol 1000 Unit(s) Oral daily  docusate sodium 100 milliGRAM(s) Oral three times a day  heparin  Infusion.  Unit(s)/Hr (15 mL/Hr) IV Continuous <Continuous>  metoprolol succinate ER 50 milliGRAM(s) Oral daily  predniSONE   Tablet 10 milliGRAM(s) Oral daily  pregabalin 50 milliGRAM(s) Oral two times a day    MEDICATIONS  (PRN):  acetaminophen   Tablet. 650 milliGRAM(s) Oral every 6 hours PRN Mild Pain (1 - 3)  heparin  Injectable 6500 Unit(s) IV Push every 6 hours PRN For aPTT less than 40  heparin  Injectable 3000 Unit(s) IV Push every 6 hours PRN For aPTT between 40 - 57  morphine  - Injectable 2 milliGRAM(s) IV Push every 6 hours PRN Severe Pain (7 - 10)  oxyCODONE    5 mG/acetaminophen 325 mG 1 Tablet(s) Oral every 4 hours PRN Moderate Pain (4 - 6)  senna 2 Tablet(s) Oral at bedtime PRN Constipation      Vital Signs Last 24 Hrs  T(C): 36.7 (02 Jan 2018 11:45), Max: 37.1 (01 Jan 2018 19:17)  T(F): 98 (02 Jan 2018 11:45), Max: 98.8 (01 Jan 2018 19:17)  HR: 72 (02 Jan 2018 11:45) (60 - 80)  BP: 141/89 (02 Jan 2018 11:45) (137/71 - 162/78)  BP(mean): --  RR: 18 (02 Jan 2018 11:45) (16 - 18)  SpO2: 98% (02 Jan 2018 11:45) (96% - 99%)  CAPILLARY BLOOD GLUCOSE        I&O's Summary    01 Jan 2018 07:01  -  02 Jan 2018 07:00  --------------------------------------------------------  IN: 510 mL / OUT: 0 mL / NET: 510 mL    02 Jan 2018 07:01  -  02 Jan 2018 12:05  --------------------------------------------------------  IN: 240 mL / OUT: 0 mL / NET: 240 mL        PHYSICAL EXAM:  GENERAL: alert, oriented X3 , well-developed  HEAD:  Atraumatic, Normocephalic  EYES: EOMI, PERRLA, conjunctiva and sclera clear  NECK: Supple, No JVD  CHEST/LUNG: Clear to auscultation bilaterally; No wheeze  HEART: Regular rate and rhythm; No murmurs, rubs, or gallops  ABDOMEN: Soft, Nontender, Nondistended; Bowel sounds present  EXTREMITIES:  Left leg swelling, tenderness to palpation  NEUROLOGY: AAOx3, non-focal  PSYCH: calm  SKIN: No rashes or lesions    LABS:                        11.9   9.85  )-----------( 256      ( 02 Jan 2018 11:31 )             36.7     01-01    146<H>  |  108  |  21  ----------------------------<  96  4.5   |  25  |  1.39<H>    Ca    9.1      01 Jan 2018 12:01    TPro  7.2  /  Alb  3.7  /  TBili  0.4  /  DBili  x   /  AST  20  /  ALT  22  /  AlkPhos  65  01-01    PT/INR - ( 01 Jan 2018 12:01 )   PT: 11.8 sec;   INR: 1.08 ratio         PTT - ( 02 Jan 2018 09:14 )  PTT:91.5 sec          RADIOLOGY & ADDITIONAL TESTS:    Imaging Personally Reviewed:    Consultant(s) Notes Reviewed:      Care Discussed with Consultants/Other Providers:    Case Discussed with Family:    Goals of Care: Patient is a 72y old  Female who presents with a chief complaint of LLE swelling and pain (01 Jan 2018 17:49)      SUBJECTIVE / OVERNIGHT EVENTS: Pt is seen and examined at bedside alert oriented in NAD. pt states the pain has been improved as well as the swelling in the leg. no new overnight event as per nursinfg staff    ROS: as mention above   All other review of systems negative    Allergies    penicillin (Anaphylaxis)    Intolerances        MEDICATIONS  (STANDING):  cholecalciferol 1000 Unit(s) Oral daily  docusate sodium 100 milliGRAM(s) Oral three times a day  heparin  Infusion.  Unit(s)/Hr (15 mL/Hr) IV Continuous <Continuous>  metoprolol succinate ER 50 milliGRAM(s) Oral daily  predniSONE   Tablet 10 milliGRAM(s) Oral daily  pregabalin 50 milliGRAM(s) Oral two times a day    MEDICATIONS  (PRN):  acetaminophen   Tablet. 650 milliGRAM(s) Oral every 6 hours PRN Mild Pain (1 - 3)  heparin  Injectable 6500 Unit(s) IV Push every 6 hours PRN For aPTT less than 40  heparin  Injectable 3000 Unit(s) IV Push every 6 hours PRN For aPTT between 40 - 57  morphine  - Injectable 2 milliGRAM(s) IV Push every 6 hours PRN Severe Pain (7 - 10)  oxyCODONE    5 mG/acetaminophen 325 mG 1 Tablet(s) Oral every 4 hours PRN Moderate Pain (4 - 6)  senna 2 Tablet(s) Oral at bedtime PRN Constipation      Vital Signs Last 24 Hrs  T(C): 36.7 (02 Jan 2018 11:45), Max: 37.1 (01 Jan 2018 19:17)  T(F): 98 (02 Jan 2018 11:45), Max: 98.8 (01 Jan 2018 19:17)  HR: 72 (02 Jan 2018 11:45) (60 - 80)  BP: 141/89 (02 Jan 2018 11:45) (137/71 - 162/78)  BP(mean): --  RR: 18 (02 Jan 2018 11:45) (16 - 18)  SpO2: 98% (02 Jan 2018 11:45) (96% - 99%)  CAPILLARY BLOOD GLUCOSE        I&O's Summary    01 Jan 2018 07:01  -  02 Jan 2018 07:00  --------------------------------------------------------  IN: 510 mL / OUT: 0 mL / NET: 510 mL    02 Jan 2018 07:01  -  02 Jan 2018 12:05  --------------------------------------------------------  IN: 240 mL / OUT: 0 mL / NET: 240 mL        PHYSICAL EXAM:  GENERAL: alert, oriented X3 , well-developed  HEAD:  Atraumatic, Normocephalic  EYES: EOMI, PERRLA, conjunctiva and sclera clear  NECK: Supple, No JVD  CHEST/LUNG: Clear to auscultation bilaterally; No wheeze  HEART: Regular rate and rhythm; No murmurs, rubs, or gallops  ABDOMEN: Soft, Nontender, Nondistended; Bowel sounds present  EXTREMITIES:  Left leg swelling, tenderness to palpation  NEUROLOGY: AAOx3, non-focal  PSYCH: calm  SKIN: No rashes or lesions    LABS:                        11.9   9.85  )-----------( 256      ( 02 Jan 2018 11:31 )             36.7     01-01    146<H>  |  108  |  21  ----------------------------<  96  4.5   |  25  |  1.39<H>    Ca    9.1      01 Jan 2018 12:01    TPro  7.2  /  Alb  3.7  /  TBili  0.4  /  DBili  x   /  AST  20  /  ALT  22  /  AlkPhos  65  01-01    PT/INR - ( 01 Jan 2018 12:01 )   PT: 11.8 sec;   INR: 1.08 ratio         PTT - ( 02 Jan 2018 09:14 )  PTT:91.5 sec        d/w Dr. Marino to establish baseline information

## 2018-01-02 NOTE — DISCHARGE NOTE ADULT - CARE PLAN
Principal Discharge DX:	Acute deep vein thrombosis (DVT) of lower extremity, unspecified laterality, unspecified vein  Goal:	Tolerates anticoagulation/ free from active bleed  Instructions for follow-up, activity and diet:	Start with [id number] twice a day for 7 days   Follow up with your doctor to decrease dose after 7days to 5mg oral twice a day  Secondary Diagnosis:	CHADWICK (acute kidney injury)  Instructions for follow-up, activity and diet:	condition improved  Secondary Diagnosis:	Essential hypertension  Instructions for follow-up, activity and diet:	Low salt diet.  Activity as tolerated.  Take all medication as prescribed.  Follow up with your medical doctor for routine blood pressure monitoring at your next visit.  Notify your doctor if you have any of the following symptoms:   Dizziness, Lightheadedness, Blurry vision, Headache, Chest pain, Shortness of breath  Secondary Diagnosis:	MS (multiple sclerosis)  Instructions for follow-up, activity and diet:	continue with copaxone

## 2018-01-02 NOTE — PROVIDER CONTACT NOTE (OTHER) - SITUATION
Critical lab value; aPTT 159.2 on heparin drip. Report given by lab per Sylvester Hermosillo at 0033

## 2018-01-02 NOTE — DISCHARGE NOTE ADULT - CARE PROVIDER_API CALL
Adriana Marino), Geriatric Medicine; [facility]; Internal Medicine  [address] [address]  Phone: [phone]  Fax: [phone]

## 2018-01-02 NOTE — PROGRESS NOTE ADULT - PROBLEM SELECTOR PLAN 1
swelling and pain improved  pt will be discharge on eliquis 10 mg q12h for 7 days and then 5 mg q12 daily.  Discussed with pt's PMD  Discussed with Pt swelling and pain improved  pt will be discharge on eliquis 10 mg q12h for 7 days and then 5 mg q12 daily.  Discussed with pt's PMD  Discussed with Pt - she will follow up with heme on discharge given abnormal SPEP in past

## 2018-01-02 NOTE — DISCHARGE NOTE ADULT - MEDICATION SUMMARY - MEDICATIONS TO TAKE
I will START or STAY ON the medications listed below when I get home from the hospital:    predniSONE 10 mg oral tablet  -- 1 tab(s) by mouth once a day  -- Indication: For steroid    Percocet 5/325 oral tablet  -- 1 tab(s) by mouth every 8 hours, As Needed  -- Indication: For Pain    Eliquis 5 mg oral tablet  -- 2 tab(s) by mouth 2 times a day  -- Indication: For DVT (deep venous thrombosis)    pregabalin 50 mg oral capsule  -- 1 cap(s) by mouth 2 times a day  -- Indication: For Pain    Toprol-XL 50 mg oral tablet, extended release  -- 1 tab(s) by mouth once a day  -- Indication: For blood pressure    Copaxone 40 mg/mL subcutaneous solution  -- 1 dose(s) subcutaneous 3 times a week on mwf  -- Indication: For MS (multiple sclerosis)    cholecalciferol oral tablet  -- 1000 unit(s) by mouth once a day  -- Indication: For vitamin supplement

## 2018-01-02 NOTE — PROGRESS NOTE ADULT - PROBLEM SELECTOR PLAN 5
on glatimer acetate injections MWF  - c/w vit d 1000   - If pt is still in hospital Wednesday, would need pt to self-inject glatimer  - c/w home prednisone 10 mg qd (has been on for 6 weeks) on glatimer acetate injections MWF  - c/w vit d 1000   - pt self-inject glatimer  - c/w home prednisone 10 mg qd (has been on for 6 weeks)

## 2018-01-03 LAB
% ALBUMIN: 49.6 % — SIGNIFICANT CHANGE UP
% ALPHA 1: 7.4 % — SIGNIFICANT CHANGE UP
% ALPHA 2: 15.8 % — SIGNIFICANT CHANGE UP
% BETA: 14.1 % — SIGNIFICANT CHANGE UP
% GAMMA: 13.1 % — SIGNIFICANT CHANGE UP
% M SPIKE: 4.2 % — SIGNIFICANT CHANGE UP
ALBUMIN SERPL ELPH-MCNC: 3.1 G/DL — LOW (ref 3.6–5.5)
ALBUMIN/GLOB SERPL ELPH: 1 RATIO — SIGNIFICANT CHANGE UP
ALPHA1 GLOB SERPL ELPH-MCNC: 0.5 G/DL — HIGH (ref 0.1–0.4)
ALPHA2 GLOB SERPL ELPH-MCNC: 1 G/DL — SIGNIFICANT CHANGE UP (ref 0.5–1)
B-GLOBULIN SERPL ELPH-MCNC: 0.9 G/DL — SIGNIFICANT CHANGE UP (ref 0.5–1)
GAMMA GLOBULIN: 0.8 G/DL — SIGNIFICANT CHANGE UP (ref 0.6–1.6)
INTERPRETATION SERPL IFE-IMP: SIGNIFICANT CHANGE UP
M-SPIKE: 0.3 G/DL — HIGH (ref 0–0)
PROT PATTERN SERPL ELPH-IMP: SIGNIFICANT CHANGE UP

## 2018-01-08 RX ORDER — GLATIRAMER ACETATE 20 MG/ML
1 INJECTION, SOLUTION SUBCUTANEOUS
Qty: 0 | Refills: 0 | COMMUNITY

## 2018-01-08 RX ORDER — APIXABAN 2.5 MG/1
2 TABLET, FILM COATED ORAL
Qty: 0 | Refills: 0 | COMMUNITY

## 2018-01-08 RX ORDER — METOPROLOL TARTRATE 50 MG
1 TABLET ORAL
Qty: 0 | Refills: 0 | COMMUNITY

## 2018-01-08 RX ORDER — GLATIRAMER ACETATE 20 MG/ML
0 INJECTION, SOLUTION SUBCUTANEOUS
Qty: 0 | Refills: 0 | COMMUNITY

## 2018-01-09 ENCOUNTER — APPOINTMENT (OUTPATIENT)
Dept: GERIATRICS | Facility: CLINIC | Age: 73
End: 2018-01-09
Payer: MEDICARE

## 2018-01-09 VITALS
TEMPERATURE: 98.6 F | SYSTOLIC BLOOD PRESSURE: 110 MMHG | RESPIRATION RATE: 15 BRPM | HEART RATE: 69 BPM | WEIGHT: 186 LBS | BODY MASS INDEX: 31.76 KG/M2 | OXYGEN SATURATION: 98 % | HEIGHT: 64 IN | DIASTOLIC BLOOD PRESSURE: 60 MMHG

## 2018-01-09 PROCEDURE — 99495 TRANSJ CARE MGMT MOD F2F 14D: CPT | Mod: GC

## 2018-01-16 PROBLEM — G35 MULTIPLE SCLEROSIS: Chronic | Status: ACTIVE | Noted: 2018-01-01

## 2018-01-16 PROBLEM — I10 ESSENTIAL (PRIMARY) HYPERTENSION: Chronic | Status: ACTIVE | Noted: 2018-01-01

## 2018-01-22 ENCOUNTER — RX RENEWAL (OUTPATIENT)
Age: 73
End: 2018-01-22

## 2018-02-06 ENCOUNTER — APPOINTMENT (OUTPATIENT)
Dept: VASCULAR SURGERY | Facility: CLINIC | Age: 73
End: 2018-02-06
Payer: MEDICARE

## 2018-02-06 ENCOUNTER — RX RENEWAL (OUTPATIENT)
Age: 73
End: 2018-02-06

## 2018-02-06 VITALS
HEIGHT: 64 IN | HEART RATE: 62 BPM | TEMPERATURE: 98.1 F | DIASTOLIC BLOOD PRESSURE: 83 MMHG | BODY MASS INDEX: 31.41 KG/M2 | SYSTOLIC BLOOD PRESSURE: 151 MMHG | WEIGHT: 184 LBS

## 2018-02-06 PROCEDURE — 93971 EXTREMITY STUDY: CPT

## 2018-02-06 PROCEDURE — 99203 OFFICE O/P NEW LOW 30 MIN: CPT

## 2018-03-06 ENCOUNTER — MEDICATION RENEWAL (OUTPATIENT)
Age: 73
End: 2018-03-06

## 2018-03-22 ENCOUNTER — RX RENEWAL (OUTPATIENT)
Age: 73
End: 2018-03-22

## 2018-03-22 NOTE — PATIENT PROFILE ADULT. - SOCIAL CONCERNS
Subjective:      Patient ID: Franci Tyson is a 16 m.o. female. HPI  Informant: Mom, Raghav     18 month [facility] (will be 18 months in a few days)    Concerns:  Rash on her back - looks like some bug bites. 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 Not really having anymore umbilical drainage like she used to and it looks more normal     Diet History:  Whole milk?  yes, [employer]   Amount of milk? 12 ounces per day  Juice? yes   Amount of juice? 15  ounces per day  Intolerances? no  Appetite? fair   Meats? few   Fruits? moderate amount   Vegetables? moderate amount  Pacifier? no  Bottle? no    Sleep History:  Sleeps in:  Own bed? yes    With parents/siblings? no    All night? no    Problems? no    Developmental Screening:   Waves bye? Yes     Stands alone? Yes   Imitates activities? Yes    Indicates wants? Yes    [place] and recovers? Yes   Walks? Yes   Stacks 2 cubes? Yes   Puts cube in cup? Yes   3-6 words? Yes   Understands simple commands? Yes   Listens to story? Yes    Medications: All medications have been reviewed. Currently is not taking over-the-counter medication(s). Medication(s) currently being used have been reviewed and added to the medication list.    Results for orders placed or performed in visit on 03/22/18   POCT blood Lead   Result Value Ref Range    Lead 4.0    POCT hemoglobin   Result Value Ref Range    Hemoglobin 11.8        Review of Systems   Constitutional: Negative for appetite change and fever. HENT: Negative for congestion and rhinorrhea. Eyes: Negative for pain and discharge. Respiratory: Negative for cough. Gastrointestinal: Negative for diarrhea and vomiting. Genitourinary: Negative for decreased urine volume. Musculoskeletal: Negative for joint swelling. Skin: Positive for rash. Neurological: Negative for seizures. Objective:   Physical Exam   Constitutional: She appears well-developed and well-nourished. She is active. No distress. HENT:   Head: Atraumatic. Right Ear: Tympanic membrane normal.   Left Ear: Tympanic membrane normal.   Nose: No nasal discharge. Mouth/Throat: Mucous membranes are moist. Dentition is normal. Oropharynx is clear. Eyes: Conjunctivae and EOM are normal. Pupils are equal, round, and reactive to light. Neck: Normal range of motion. Neck supple. No neck adenopathy. Cardiovascular: Normal rate, regular rhythm and S1 normal.  Pulses are strong. No murmur heard. Pulmonary/Chest: Effort normal and breath sounds normal. No respiratory distress. She has no wheezes. She has no rhonchi. Abdominal: Soft. Bowel sounds are normal. She exhibits no distension and no mass. There is no hepatosplenomegaly. There is no tenderness. Genitourinary:   Genitourinary Comments: Normal female external, prepubertal; erythematous papular rash in  region   Musculoskeletal: Normal range of motion. She exhibits no tenderness or deformity. Neurological: She is alert. She has normal reflexes. She exhibits normal muscle tone. Coordination normal.   Skin: Skin is warm. Capillary refill takes less than 3 seconds. Rash (grouped erythematous papules on right upper back) noted. Nursing note and vitals reviewed. Assessment / Plan:      1. Encounter for routine child health examination without abnormal findings     2. Screening for deficiency anemia  POCT hemoglobin   3. Screening for lead exposure  POCT blood Lead   4. Need for vaccination  Pneumococcal conjugate vaccine 13-valent    Varicella vaccine subcutaneous    Hep A Vaccine Ped/Adol (HAVRIX)    DTaP HiB IPV (age 6w-4y) IM (Pentacel)   11. Candidal diaper dermatitis         Well Child  Growth chart reviewed.  Immunizations were given as None

## 2018-03-26 ENCOUNTER — RX RENEWAL (OUTPATIENT)
Age: 73
End: 2018-03-26

## 2018-03-27 ENCOUNTER — RX RENEWAL (OUTPATIENT)
Age: 73
End: 2018-03-27

## 2018-04-10 ENCOUNTER — APPOINTMENT (OUTPATIENT)
Dept: VASCULAR SURGERY | Facility: CLINIC | Age: 73
End: 2018-04-10
Payer: MEDICARE

## 2018-04-10 VITALS
HEART RATE: 67 BPM | BODY MASS INDEX: 31.24 KG/M2 | SYSTOLIC BLOOD PRESSURE: 138 MMHG | HEIGHT: 64 IN | TEMPERATURE: 98 F | DIASTOLIC BLOOD PRESSURE: 83 MMHG | WEIGHT: 183 LBS

## 2018-04-10 PROCEDURE — 93970 EXTREMITY STUDY: CPT

## 2018-04-10 PROCEDURE — 99213 OFFICE O/P EST LOW 20 MIN: CPT

## 2018-04-10 PROCEDURE — 99213 OFFICE O/P EST LOW 20 MIN: CPT | Mod: 25

## 2018-04-26 ENCOUNTER — APPOINTMENT (OUTPATIENT)
Dept: GERIATRICS | Facility: CLINIC | Age: 73
End: 2018-04-26
Payer: MEDICARE

## 2018-04-26 VITALS
BODY MASS INDEX: 32.75 KG/M2 | DIASTOLIC BLOOD PRESSURE: 70 MMHG | HEART RATE: 76 BPM | WEIGHT: 190.8 LBS | SYSTOLIC BLOOD PRESSURE: 140 MMHG | RESPIRATION RATE: 12 BRPM

## 2018-04-26 VITALS — OXYGEN SATURATION: 96 %

## 2018-04-26 DIAGNOSIS — F41.9 ANXIETY DISORDER, UNSPECIFIED: ICD-10-CM

## 2018-04-26 PROCEDURE — 99214 OFFICE O/P EST MOD 30 MIN: CPT

## 2018-05-03 LAB
24R-OH-CALCIDIOL SERPL-MCNC: 25.2 PG/ML
ALBUMIN SERPL ELPH-MCNC: 4.1 G/DL
ALP BLD-CCNC: 63 U/L
ALT SERPL-CCNC: 11 U/L
ANION GAP SERPL CALC-SCNC: 14 MMOL/L
AST SERPL-CCNC: 16 U/L
BASOPHILS # BLD AUTO: 0.01 K/UL
BASOPHILS NFR BLD AUTO: 0.1 %
BILIRUB SERPL-MCNC: 0.2 MG/DL
BUN SERPL-MCNC: 27 MG/DL
CALCIUM SERPL-MCNC: 9.8 MG/DL
CHLORIDE SERPL-SCNC: 110 MMOL/L
CO2 SERPL-SCNC: 23 MMOL/L
CREAT SERPL-MCNC: 1.44 MG/DL
EOSINOPHIL # BLD AUTO: 0.49 K/UL
EOSINOPHIL NFR BLD AUTO: 6.4 %
ERYTHROCYTE [SEDIMENTATION RATE] IN BLOOD BY WESTERGREN METHOD: 37 MM/HR
GLUCOSE SERPL-MCNC: 106 MG/DL
HBA1C MFR BLD HPLC: 5.7 %
HCT VFR BLD CALC: 37.7 %
HGB BLD-MCNC: 12 G/DL
IMM GRANULOCYTES NFR BLD AUTO: 0.1 %
LYMPHOCYTES # BLD AUTO: 2.25 K/UL
LYMPHOCYTES NFR BLD AUTO: 29.5 %
MAN DIFF?: NORMAL
MCHC RBC-ENTMCNC: 30.4 PG
MCHC RBC-ENTMCNC: 31.8 GM/DL
MCV RBC AUTO: 95.4 FL
MONOCYTES # BLD AUTO: 1.19 K/UL
MONOCYTES NFR BLD AUTO: 15.6 %
NEUTROPHILS # BLD AUTO: 3.68 K/UL
NEUTROPHILS NFR BLD AUTO: 48.3 %
PLATELET # BLD AUTO: 201 K/UL
POTASSIUM SERPL-SCNC: 4.4 MMOL/L
PROT SERPL-MCNC: 7 G/DL
RBC # BLD: 3.95 M/UL
RBC # FLD: 14.6 %
SODIUM SERPL-SCNC: 147 MMOL/L
TSH SERPL-ACNC: 1.37 UIU/ML
WBC # FLD AUTO: 7.63 K/UL

## 2018-06-11 ENCOUNTER — RX RENEWAL (OUTPATIENT)
Age: 73
End: 2018-06-11

## 2018-07-17 ENCOUNTER — APPOINTMENT (OUTPATIENT)
Dept: VASCULAR SURGERY | Facility: CLINIC | Age: 73
End: 2018-07-17
Payer: MEDICARE

## 2018-07-17 VITALS
WEIGHT: 185 LBS | BODY MASS INDEX: 31.58 KG/M2 | HEIGHT: 64 IN | HEART RATE: 64 BPM | TEMPERATURE: 98.8 F | SYSTOLIC BLOOD PRESSURE: 156 MMHG | DIASTOLIC BLOOD PRESSURE: 75 MMHG

## 2018-07-17 PROCEDURE — 99213 OFFICE O/P EST LOW 20 MIN: CPT

## 2018-07-17 PROCEDURE — 93971 EXTREMITY STUDY: CPT

## 2018-07-24 ENCOUNTER — APPOINTMENT (OUTPATIENT)
Dept: GERIATRICS | Facility: CLINIC | Age: 73
End: 2018-07-24
Payer: MEDICARE

## 2018-07-24 VITALS
BODY MASS INDEX: 32.78 KG/M2 | OXYGEN SATURATION: 96 % | TEMPERATURE: 98.4 F | WEIGHT: 192 LBS | RESPIRATION RATE: 15 BRPM | SYSTOLIC BLOOD PRESSURE: 120 MMHG | DIASTOLIC BLOOD PRESSURE: 70 MMHG | HEIGHT: 64 IN | HEART RATE: 69 BPM

## 2018-07-24 DIAGNOSIS — R63.5 ABNORMAL WEIGHT GAIN: ICD-10-CM

## 2018-07-24 PROBLEM — I82.409 ACUTE EMBOLISM AND THROMBOSIS OF UNSPECIFIED DEEP VEINS OF UNSPECIFIED LOWER EXTREMITY: Chronic | Status: ACTIVE | Noted: 2018-01-01

## 2018-07-24 PROCEDURE — 99215 OFFICE O/P EST HI 40 MIN: CPT

## 2018-07-24 RX ORDER — DICLOFENAC SODIUM 10 MG/G
1 GEL TOPICAL DAILY
Qty: 1 | Refills: 3 | Status: ACTIVE | COMMUNITY
Start: 2017-03-16 | End: 1900-01-01

## 2018-08-21 ENCOUNTER — RX RENEWAL (OUTPATIENT)
Age: 73
End: 2018-08-21

## 2018-08-29 ENCOUNTER — APPOINTMENT (OUTPATIENT)
Dept: VASCULAR SURGERY | Facility: CLINIC | Age: 73
End: 2018-08-29
Payer: MEDICARE

## 2018-08-29 VITALS
WEIGHT: 193 LBS | HEIGHT: 64 IN | DIASTOLIC BLOOD PRESSURE: 66 MMHG | TEMPERATURE: 99.4 F | HEART RATE: 77 BPM | SYSTOLIC BLOOD PRESSURE: 112 MMHG | BODY MASS INDEX: 32.95 KG/M2

## 2018-08-29 DIAGNOSIS — Z09 ENCOUNTER FOR FOLLOW-UP EXAMINATION AFTER COMPLETED TREATMENT FOR CONDITIONS OTHER THAN MALIGNANT NEOPLASM: ICD-10-CM

## 2018-08-29 PROCEDURE — 99213 OFFICE O/P EST LOW 20 MIN: CPT

## 2018-09-05 ENCOUNTER — RX RENEWAL (OUTPATIENT)
Age: 73
End: 2018-09-05

## 2018-09-25 ENCOUNTER — RX RENEWAL (OUTPATIENT)
Age: 73
End: 2018-09-25

## 2018-10-16 ENCOUNTER — APPOINTMENT (OUTPATIENT)
Dept: GERIATRICS | Facility: CLINIC | Age: 73
End: 2018-10-16
Payer: MEDICARE

## 2018-10-16 VITALS
BODY MASS INDEX: 33.78 KG/M2 | SYSTOLIC BLOOD PRESSURE: 140 MMHG | HEART RATE: 65 BPM | DIASTOLIC BLOOD PRESSURE: 64 MMHG | OXYGEN SATURATION: 96 % | WEIGHT: 196.8 LBS | TEMPERATURE: 98 F

## 2018-10-16 DIAGNOSIS — Z23 ENCOUNTER FOR IMMUNIZATION: ICD-10-CM

## 2018-10-16 PROCEDURE — 90686 IIV4 VACC NO PRSV 0.5 ML IM: CPT | Mod: GC

## 2018-10-16 PROCEDURE — 99214 OFFICE O/P EST MOD 30 MIN: CPT | Mod: 25

## 2018-10-16 PROCEDURE — G0008: CPT | Mod: GC

## 2019-01-08 ENCOUNTER — RX RENEWAL (OUTPATIENT)
Age: 74
End: 2019-01-08

## 2019-01-09 ENCOUNTER — RX RENEWAL (OUTPATIENT)
Age: 74
End: 2019-01-09

## 2019-01-15 ENCOUNTER — APPOINTMENT (OUTPATIENT)
Dept: GERIATRICS | Facility: CLINIC | Age: 74
End: 2019-01-15
Payer: MEDICARE

## 2019-01-15 VITALS
OXYGEN SATURATION: 99 % | RESPIRATION RATE: 15 BRPM | TEMPERATURE: 97.6 F | WEIGHT: 195 LBS | BODY MASS INDEX: 33.29 KG/M2 | HEART RATE: 68 BPM | HEIGHT: 64 IN | SYSTOLIC BLOOD PRESSURE: 120 MMHG | DIASTOLIC BLOOD PRESSURE: 60 MMHG

## 2019-01-15 PROCEDURE — 99214 OFFICE O/P EST MOD 30 MIN: CPT | Mod: GC

## 2019-01-19 LAB
24R-OH-CALCIDIOL SERPL-MCNC: 27.6 PG/ML
ALBUMIN SERPL ELPH-MCNC: 4.1 G/DL
ALP BLD-CCNC: 63 U/L
ALT SERPL-CCNC: 19 U/L
ANION GAP SERPL CALC-SCNC: 12 MMOL/L
AST SERPL-CCNC: 21 U/L
BASOPHILS # BLD AUTO: 0.02 K/UL
BASOPHILS NFR BLD AUTO: 0.3 %
BILIRUB SERPL-MCNC: 0.3 MG/DL
BUN SERPL-MCNC: 17 MG/DL
CALCIUM SERPL-MCNC: 9.2 MG/DL
CHLORIDE SERPL-SCNC: 109 MMOL/L
CHOLEST SERPL-MCNC: 175 MG/DL
CHOLEST/HDLC SERPL: 3.4 RATIO
CO2 SERPL-SCNC: 24 MMOL/L
CREAT SERPL-MCNC: 1.37 MG/DL
EOSINOPHIL # BLD AUTO: 0.38 K/UL
EOSINOPHIL NFR BLD AUTO: 4.8 %
GLUCOSE SERPL-MCNC: 103 MG/DL
HCT VFR BLD CALC: 38.7 %
HDLC SERPL-MCNC: 51 MG/DL
HGB BLD-MCNC: 12.5 G/DL
IMM GRANULOCYTES NFR BLD AUTO: 0.1 %
LDLC SERPL CALC-MCNC: 88 MG/DL
LYMPHOCYTES # BLD AUTO: 1.87 K/UL
LYMPHOCYTES NFR BLD AUTO: 23.8 %
MAN DIFF?: NORMAL
MCHC RBC-ENTMCNC: 30.6 PG
MCHC RBC-ENTMCNC: 32.3 GM/DL
MCV RBC AUTO: 94.9 FL
MONOCYTES # BLD AUTO: 0.55 K/UL
MONOCYTES NFR BLD AUTO: 7 %
NEUTROPHILS # BLD AUTO: 5.03 K/UL
NEUTROPHILS NFR BLD AUTO: 64 %
PLATELET # BLD AUTO: 210 K/UL
POTASSIUM SERPL-SCNC: 4.1 MMOL/L
PROT SERPL-MCNC: 7 G/DL
RBC # BLD: 4.08 M/UL
RBC # FLD: 14.9 %
SODIUM SERPL-SCNC: 146 MMOL/L
TRIGL SERPL-MCNC: 178 MG/DL
TSH SERPL-ACNC: 1.67 UIU/ML
WBC # FLD AUTO: 7.86 K/UL

## 2019-01-19 NOTE — PHYSICAL EXAM
[General Appearance - Alert] : alert [General Appearance - In No Acute Distress] : in no acute distress [General Appearance - Well Nourished] : well nourished [Sclera] : the sclera and conjunctiva were normal [PERRL With Normal Accommodation] : pupils were equal in size, round, and reactive to light [Extraocular Movements] : extraocular movements were intact [Normal Oral Mucosa] : normal oral mucosa [No Oral Pallor] : no oral pallor [Outer Ear] : the ears and nose were normal in appearance [Respiration, Rhythm And Depth] : normal respiratory rhythm and effort [Apical Impulse] : the apical impulse was normal [Heart Rate And Rhythm] : heart rate was normal and rhythm regular [Heart Sounds] : normal S1 and S2 [Bowel Sounds] : normal bowel sounds [Abdomen Soft] : soft [Abdomen Tenderness] : non-tender [No CVA Tenderness] : no ~M costovertebral angle tenderness [Musculoskeletal - Swelling] : no joint swelling seen [Motor Tone] : muscle strength and tone were normal [] : no rash [FreeTextEntry1] : +1 edema in the left leg

## 2019-01-19 NOTE — HISTORY OF PRESENT ILLNESS
[PMH Reviewed and Updated] : past medical history reviewed and updated [PSH Reviewed and Updated] : past surgical history reviewed and updated [Family History Reviewed and Updated] : family history reviewed and updated [Medication and Allergies Reconciled] : medication and allergies reconciled [3] : 3 [___ Daughters] : [unfilled] daughter(s) [Retired] : retired from work [None] : The patient has no concerns about alcohol abuse [Over the Past 2 Weeks, Have You Felt Down, Depressed, or Hopeless?] : 1.) Over the past 2 weeks, have you felt down, depressed, or hopeless? No [Over the Past 2 Weeks, Have You Felt Little Interest or Pleasure Doing Things?] : 2.) Over the past 2 weeks, have you felt little interest or pleasure doing things? No [de-identified] : More osteoarthritis pain in knees - 0 when sitting, up to 6 with walking. [de-identified] : 0 [de-identified] : 1 son in law, 1 grandchild [FreeTextEntry1] : 74 y/o female with past medical history significant for MS and DVT present today for annual exam.  Pt states she was recently started on prednisone 5mg once day for arthritis by rheumatologist. States it provided mild improvement.  Admits to no falls since the last visit. Pt reports good appetite. No new ER visits and hospitalization since October 2018. Denies any headaches, dizziness, chest pain, palpation, N/V, abdominal cramping, numbness or tingling, no weight loss. \par \par Pt admits to developing a rash after wearing compression stockings. Pt lives alone and is expecting a new grandchild in August. Reports compliance on medication.

## 2019-01-19 NOTE — END OF VISIT
[] : Fellow [FreeTextEntry3] : Pt seen with fellow.  Pt stable, note reviewed and edited.  Agree with note.

## 2019-02-11 ENCOUNTER — RX RENEWAL (OUTPATIENT)
Age: 74
End: 2019-02-11

## 2019-02-26 ENCOUNTER — RX RENEWAL (OUTPATIENT)
Age: 74
End: 2019-02-26

## 2019-04-09 ENCOUNTER — RX RENEWAL (OUTPATIENT)
Age: 74
End: 2019-04-09

## 2019-05-07 ENCOUNTER — RX RENEWAL (OUTPATIENT)
Age: 74
End: 2019-05-07

## 2019-05-30 ENCOUNTER — RX RENEWAL (OUTPATIENT)
Age: 74
End: 2019-05-30

## 2019-06-06 ENCOUNTER — RX RENEWAL (OUTPATIENT)
Age: 74
End: 2019-06-06

## 2019-07-16 ENCOUNTER — APPOINTMENT (OUTPATIENT)
Dept: GERIATRICS | Facility: CLINIC | Age: 74
End: 2019-07-16
Payer: MEDICARE

## 2019-07-16 ENCOUNTER — NON-APPOINTMENT (OUTPATIENT)
Age: 74
End: 2019-07-16

## 2019-07-16 VITALS
WEIGHT: 194.19 LBS | HEIGHT: 64 IN | TEMPERATURE: 98.6 F | DIASTOLIC BLOOD PRESSURE: 78 MMHG | OXYGEN SATURATION: 98 % | BODY MASS INDEX: 33.15 KG/M2 | RESPIRATION RATE: 15 BRPM | HEART RATE: 78 BPM | SYSTOLIC BLOOD PRESSURE: 140 MMHG

## 2019-07-16 PROCEDURE — 99214 OFFICE O/P EST MOD 30 MIN: CPT | Mod: GC,25

## 2019-07-16 PROCEDURE — 93000 ELECTROCARDIOGRAM COMPLETE: CPT | Mod: GC

## 2019-07-16 NOTE — REVIEW OF SYSTEMS
[Fever] : no fever [Chills] : no chills [Chest Pain] : no chest pain [Palpitations] : no palpitations [Shortness Of Breath] : no shortness of breath [Cough] : no cough [SOB on Exertion] : no shortness of breath during exertion [Abdominal Pain] : no abdominal pain [Vomiting] : no vomiting [Constipation] : no constipation [Diarrhea] : no diarrhea

## 2019-07-16 NOTE — PHYSICAL EXAM
[General Appearance - Alert] : alert [General Appearance - In No Acute Distress] : in no acute distress [General Appearance - Well Developed] : well developed [Respiration, Rhythm And Depth] : normal respiratory rhythm and effort [Auscultation Breath Sounds / Voice Sounds] : lungs were clear to auscultation bilaterally [Heart Rate And Rhythm] : heart rate was normal and rhythm regular [Heart Sounds] : normal S1 and S2 [Murmurs] : no murmurs [Bowel Sounds] : normal bowel sounds [Abdomen Soft] : soft [Abdomen Tenderness] : non-tender [Abdomen Mass (___ Cm)] : no abdominal mass palpated [Sclera] : the sclera and conjunctiva were normal [PERRL With Normal Accommodation] : pupils were equal in size, round, and reactive to light [Extraocular Movements] : extraocular movements were intact [Normal Oral Mucosa] : normal oral mucosa [No Oral Pallor] : no oral pallor [No Oral Cyanosis] : no oral cyanosis [Outer Ear] : the ears and nose were normal in appearance [Oropharynx] : The oropharynx was normal [Neck Appearance] : the appearance of the neck was normal [Neck Cervical Mass (___cm)] : no neck mass was observed [Jugular Venous Distention Increased] : there was no jugular-venous distention [Thyroid Diffuse Enlargement] : the thyroid was not enlarged [Thyroid Nodule] : there were no palpable thyroid nodules [No CVA Tenderness] : no ~M costovertebral angle tenderness [No Spinal Tenderness] : no spinal tenderness [Abnormal Walk] : normal gait [Nail Clubbing] : no clubbing  or cyanosis of the fingernails [Musculoskeletal - Swelling] : no joint swelling seen [Motor Tone] : muscle strength and tone were normal [Skin Color & Pigmentation] : normal skin color and pigmentation [Skin Turgor] : normal skin turgor [] : no rash

## 2019-07-16 NOTE — ASSESSMENT
[FreeTextEntry1] : 75 yo female with past medical history significant for MS, osteoarthritis and DVT present today for f/u of chronic medical conditions.\par \par DVT: C/w Eliquis\par \par HTN: Well-controlled. C/w metoprolol 25 in AM, 50 in PM\par \par MS: Stable. No acute issues. Saw her neurologist yesterday and continued on 5mg prednisone. Will f/u at regularly scheduled interval.\par \par Osteoarthritis: Planning on L knee replacement in September. Will continue with percocet PRN and voltaren gel for pain.\par \par F/u in 6 months or earlier as needed for acute issues

## 2019-07-16 NOTE — REASON FOR VISIT
[Follow-Up] : a follow-up visit [FreeTextEntry1] : BESSY ZAZUETA is a 73 year old female being seen for a comprehensive physical exam.

## 2019-07-16 NOTE — END OF VISIT
[] : Resident [FreeTextEntry3] : Pt seen with resident. EKG reviewed, done in prep for cataract surgery.

## 2019-07-16 NOTE — HISTORY OF PRESENT ILLNESS
[FreeTextEntry1] : 75 yo female with past medical history significant for MS, osteoarthritis and DVT present today for f/u of chronic medical conditions.\par \par Pt has been stable since last visit in January. No falls, no MS flares, no hospitalizations. Planning on cataract surgery next month with Dr. Livingston.\par \par Still having severe L knee pain 2/2 osteoarthritis. At rest, pain is 0 but on ambulation, having 10/10 pain. Has found an orthopedic surgeon at [facility] for special surgeries [facility] planning on doing L knee replacement in September with Dr. Simpson. Has tried injections to the knee in the past without any relief. Requires opiates at times for pain relief.\par \par Denies any headaches, dizziness, chest pain, palpation, N/V, abdominal cramping, numbness or tingling, no weight loss. No dysuria or cough [0] : 2) Feeling down, depressed, or hopeless: Not at all

## 2019-07-22 ENCOUNTER — RX RENEWAL (OUTPATIENT)
Age: 74
End: 2019-07-22

## 2019-08-01 ENCOUNTER — RX RENEWAL (OUTPATIENT)
Age: 74
End: 2019-08-01

## 2019-09-02 PROBLEM — Z09 FOLLOW UP: Status: ACTIVE | Noted: 2018-08-29

## 2019-09-18 ENCOUNTER — APPOINTMENT (OUTPATIENT)
Dept: VASCULAR SURGERY | Facility: CLINIC | Age: 74
End: 2019-09-18
Payer: MEDICARE

## 2019-09-18 VITALS
BODY MASS INDEX: 33.12 KG/M2 | HEART RATE: 69 BPM | SYSTOLIC BLOOD PRESSURE: 146 MMHG | TEMPERATURE: 98.8 F | WEIGHT: 194 LBS | HEIGHT: 64 IN | DIASTOLIC BLOOD PRESSURE: 76 MMHG

## 2019-09-18 PROCEDURE — 99213 OFFICE O/P EST LOW 20 MIN: CPT

## 2019-09-18 PROCEDURE — 93971 EXTREMITY STUDY: CPT

## 2019-09-19 ENCOUNTER — NON-APPOINTMENT (OUTPATIENT)
Age: 74
End: 2019-09-19

## 2019-09-19 ENCOUNTER — APPOINTMENT (OUTPATIENT)
Dept: GERIATRICS | Facility: CLINIC | Age: 74
End: 2019-09-19
Payer: MEDICARE

## 2019-09-19 VITALS
SYSTOLIC BLOOD PRESSURE: 136 MMHG | WEIGHT: 198.8 LBS | HEART RATE: 68 BPM | TEMPERATURE: 98 F | BODY MASS INDEX: 33.94 KG/M2 | DIASTOLIC BLOOD PRESSURE: 80 MMHG | OXYGEN SATURATION: 96 % | HEIGHT: 64 IN | RESPIRATION RATE: 15 BRPM

## 2019-09-19 DIAGNOSIS — M66.0 RUPTURE OF POPLITEAL CYST: ICD-10-CM

## 2019-09-19 PROCEDURE — 93000 ELECTROCARDIOGRAM COMPLETE: CPT

## 2019-09-19 PROCEDURE — G0008: CPT

## 2019-09-19 PROCEDURE — 99214 OFFICE O/P EST MOD 30 MIN: CPT | Mod: 25

## 2019-09-19 PROCEDURE — 90662 IIV NO PRSV INCREASED AG IM: CPT

## 2019-09-22 PROBLEM — M66.0 RUPTURED BAKERS CYST: Status: ACTIVE | Noted: 2018-02-06

## 2019-09-22 NOTE — HISTORY OF PRESENT ILLNESS
[Preoperative Visit] : for a medical evaluation prior to surgery [Scheduled Procedure ___] : a [unfilled] [Fever] : no fever [Chills] : no chills [Fatigue] : no fatigue [Chest Pain] : no chest pain [Cough] : no cough [Dyspnea] : no dyspnea [Dysuria] : no dysuria [Urinary Frequency] : no urinary frequency [Nausea] : no nausea [Vomiting] : no vomiting [Diarrhea] : no diarrhea [Abdominal Pain] : no abdominal pain [Easy Bruising] : no easy bruising [Lower Extremity Swelling] : lower extremity swelling [Poor Exercise Tolerance] : no poor exercise tolerance [Diabetes] : no diabetes [Cardiovascular Disease] : no cardiovascular disease [Pulmonary Disease] : no pulmonary disease [Anti-Platelet Agents] : anti-platelet agents [Nicotine Dependence] : no nicotine dependence [Alcohol Use] : no  alcohol use [Renal Disease] : no renal disease [GI Disease] : no gastrointestinal disease [Sleep Apnea] : no sleep apnea [Thromboembolic Problems] : thromboembolic problems [Frequent use of NSAIDs] : no use of NSAIDs [Transfusion Reaction] : no transfusion reaction [Impaired Immunity] : no impaired immunity [Steroid Use in Last 6 Months] : no steroid use in the last six months [Frequent Aspirin Use] : no frequent aspirin use [Prior Anesthesia] : Prior anesthesia [Prev Anesthesia Reaction] : no previous anesthesia reaction [Good] : Good [Anesthesia Reaction] : no anesthesia reaction [Sudden Death] : no sudden death [Clotting Disorder] : no clotting disorder [Bleeding Disorder] : no bleeding disorder [de-identified] : pt with dvt history on left leg [de-identified] : dvt history

## 2019-09-22 NOTE — ASSESSMENT
[Procedure Intermediate Risk] : the procedure risk is intermediate [Patient Intermediate Risk] : the patient is an intermediate risk [Optimized for Surgery Pending Laboratory Results] : the patient is optimized for surgery pending laboratory results [As per surgery] : as per surgery [Continue] : Continue medications as currently directed [FreeTextEntry2] : and as per vascular surgeon who is leading dvt workup  [FreeTextEntry3] : as per surgeons (Cataract and Left knee replacement surgeon) [FreeTextEntry1] : 75 yo female with Multiple sclerosis, osteoarthritis, dvt first occurred over 30 years ago and then another 2 years ago perhaps triggered by a baker's cyst. Pt has been on longterm eliquis for dvt treatment and now for prevention. \par \par 1) Cataract -  Pt to have cataract removal - stable, medically stable, second caratract surgery. pt tolerated last procedure well. \par \par 2) Osteoarthritis - left knee replacement to be done for severe osteoarthritis - pt medically stable from my perspecitive. Longtern eliquis being addressed by Vascular surgery. I would continue for now and address post opereatively.  \par \par 3) MS - stable\par \par 4) HOme support available postoperatively.

## 2019-09-22 NOTE — PHYSICAL EXAM
[General Appearance - Alert] : alert [General Appearance - In No Acute Distress] : in no acute distress [Sclera] : the sclera and conjunctiva were normal [PERRL With Normal Accommodation] : pupils were equal in size, round, and reactive to light [Extraocular Movements] : extraocular movements were intact [Outer Ear] : the ears and nose were normal in appearance [Oropharynx] : the oropharynx was normal [Neck Appearance] : the appearance of the neck was normal [Neck Cervical Mass (___cm)] : no neck mass was observed [Jugular Venous Distention Increased] : there was no jugular-venous distention [Thyroid Diffuse Enlargement] : the thyroid was not enlarged [Thyroid Nodule] : there were no palpable thyroid nodules [Auscultation Breath Sounds / Voice Sounds] : lungs were clear to auscultation bilaterally [Heart Rate And Rhythm] : heart rate was normal and rhythm regular [Heart Sounds] : normal S1 and S2 [Heart Sounds Gallop] : no gallops [Murmurs] : no murmurs [Heart Sounds Pericardial Friction Rub] : no pericardial rub [Full Pulse] : the pedal pulses are present [Bowel Sounds] : normal bowel sounds [Abdomen Soft] : soft [Abdomen Tenderness] : non-tender [Abdomen Mass (___ Cm)] : no abdominal mass palpated [Cervical Lymph Nodes Enlarged Posterior Bilaterally] : posterior cervical [Cervical Lymph Nodes Enlarged Anterior Bilaterally] : anterior cervical [Supraclavicular Lymph Nodes Enlarged Bilaterally] : supraclavicular [Axillary Lymph Nodes Enlarged Bilaterally] : axillary [Femoral Lymph Nodes Enlarged Bilaterally] : femoral [Inguinal Lymph Nodes Enlarged Bilaterally] : inguinal [No CVA Tenderness] : no ~M costovertebral angle tenderness [No Spinal Tenderness] : no spinal tenderness [Abnormal Walk] : normal gait [Nail Clubbing] : no clubbing  or cyanosis of the fingernails [Musculoskeletal - Swelling] : no joint swelling seen [Motor Tone] : muscle strength and tone were normal [Skin Color & Pigmentation] : normal skin color and pigmentation [Skin Turgor] : normal skin turgor [] : no rash [Deep Tendon Reflexes (DTR)] : deep tendon reflexes were 2+ and symmetric [Sensation] : the sensory exam was normal to light touch and pinprick [No Focal Deficits] : no focal deficits [Oriented To Time, Place, And Person] : oriented to person, place, and time [Impaired Insight] : insight and judgment were intact [Affect] : the affect was normal [Over the Past 2 Weeks, Have You Felt Down, Depressed, or Hopeless?] : 1.) Over the past 2 weeks, have you felt down, depressed, or hopeless? No [Over the Past 2 Weeks, Have You Felt Little Interest or Pleasure Doing Things?] : 2.) Over the past 2 weeks, have you felt little interest or pleasure doing things? No [FreeTextEntry1] : in support group and going regularly

## 2019-10-01 ENCOUNTER — RX RENEWAL (OUTPATIENT)
Age: 74
End: 2019-10-01

## 2019-10-07 ENCOUNTER — TRANSCRIPTION ENCOUNTER (OUTPATIENT)
Age: 74
End: 2019-10-07

## 2019-10-19 ENCOUNTER — RX RENEWAL (OUTPATIENT)
Age: 74
End: 2019-10-19

## 2019-10-31 ENCOUNTER — RX RENEWAL (OUTPATIENT)
Age: 74
End: 2019-10-31

## 2019-11-06 ENCOUNTER — RX RENEWAL (OUTPATIENT)
Age: 74
End: 2019-11-06

## 2019-11-26 ENCOUNTER — RX RENEWAL (OUTPATIENT)
Age: 74
End: 2019-11-26

## 2019-12-26 ENCOUNTER — MEDICATION RENEWAL (OUTPATIENT)
Age: 74
End: 2019-12-26

## 2019-12-26 ENCOUNTER — RX RENEWAL (OUTPATIENT)
Age: 74
End: 2019-12-26

## 2020-02-26 ENCOUNTER — RX RENEWAL (OUTPATIENT)
Age: 75
End: 2020-02-26

## 2020-04-03 ENCOUNTER — RESULT REVIEW (OUTPATIENT)
Age: 75
End: 2020-04-03

## 2020-04-22 ENCOUNTER — FORM ENCOUNTER (OUTPATIENT)
Age: 75
End: 2020-04-22

## 2020-07-06 ENCOUNTER — RX RENEWAL (OUTPATIENT)
Age: 75
End: 2020-07-06

## 2020-08-31 ENCOUNTER — RX RENEWAL (OUTPATIENT)
Age: 75
End: 2020-08-31

## 2020-09-09 RX ORDER — PREDNISONE 5 MG/1
5 TABLET ORAL
Refills: 0 | Status: DISCONTINUED | COMMUNITY
Start: 2019-01-15 | End: 2020-09-09

## 2020-09-10 ENCOUNTER — APPOINTMENT (OUTPATIENT)
Dept: GERIATRICS | Facility: CLINIC | Age: 75
End: 2020-09-10
Payer: MEDICARE

## 2020-09-10 VITALS
BODY MASS INDEX: 32.3 KG/M2 | DIASTOLIC BLOOD PRESSURE: 76 MMHG | TEMPERATURE: 97.7 F | OXYGEN SATURATION: 97 % | HEART RATE: 74 BPM | RESPIRATION RATE: 16 BRPM | HEIGHT: 64 IN | SYSTOLIC BLOOD PRESSURE: 156 MMHG | WEIGHT: 189.2 LBS

## 2020-09-10 PROCEDURE — 99215 OFFICE O/P EST HI 40 MIN: CPT

## 2020-09-15 NOTE — HISTORY OF PRESENT ILLNESS
[0] : 1) Little interest or pleasure doing things: Not at all [FreeTextEntry1] : 74 yo female with recent hip fracutre after fall at home, went to [facility] for surgery then [facility] for rehab and developed COVID. Discharged home and saw neurologist following hospital and rehab stay. Pt has MS. Dr Moya did an MRI of spine for weakness inlegs to rule out CPine disease and an incidental thyroid and parathyroid nodule found.  Dr Moya called me and I asked pt to come into to office. Pt denies fatiue, constipation, or opposite. Pt doing well.\par \par Dtr in room with pt and agrees.

## 2020-09-15 NOTE — PHYSICAL EXAM
[General Appearance - Alert] : alert [Sclera] : the sclera and conjunctiva were normal [General Appearance - In No Acute Distress] : in no acute distress [Extraocular Movements] : extraocular movements were intact [PERRL With Normal Accommodation] : pupils were equal in size, round, and reactive to light [Normal Oral Mucosa] : normal oral mucosa [Outer Ear] : the ears and nose were normal in appearance [No Oral Cyanosis] : no oral cyanosis [No Oral Pallor] : no oral pallor [Neck Cervical Mass (___cm)] : no neck mass was observed [Neck Appearance] : the appearance of the neck was normal [Oropharynx] : The oropharynx was normal [Jugular Venous Distention Increased] : there was no jugular-venous distention [Thyroid Nodule] : there were no palpable thyroid nodules [Thyroid Diffuse Enlargement] : the thyroid was not enlarged [Heart Rate And Rhythm] : heart rate was normal and rhythm regular [Auscultation Breath Sounds / Voice Sounds] : lungs were clear to auscultation bilaterally [Heart Sounds] : normal S1 and S2 [Murmurs] : no murmurs [Heart Sounds Gallop] : no gallops [Full Pulse] : the pedal pulses are present [Heart Sounds Pericardial Friction Rub] : no pericardial rub [Breast Appearance] : normal in appearance [Breast Palpation Mass] : no palpable masses [Abdomen Soft] : soft [Abdomen Tenderness] : non-tender [Bowel Sounds] : normal bowel sounds [Abdomen Mass (___ Cm)] : no abdominal mass palpated [Cervical Lymph Nodes Enlarged Posterior Bilaterally] : posterior cervical [Supraclavicular Lymph Nodes Enlarged Bilaterally] : supraclavicular [Cervical Lymph Nodes Enlarged Anterior Bilaterally] : anterior cervical [Axillary Lymph Nodes Enlarged Bilaterally] : axillary [Femoral Lymph Nodes Enlarged Bilaterally] : femoral [No CVA Tenderness] : no ~M costovertebral angle tenderness [Inguinal Lymph Nodes Enlarged Bilaterally] : inguinal [No Spinal Tenderness] : no spinal tenderness [Abnormal Walk] : normal gait [Nail Clubbing] : no clubbing  or cyanosis of the fingernails [Musculoskeletal - Swelling] : no joint swelling seen [Motor Tone] : muscle strength and tone were normal [Skin Color & Pigmentation] : normal skin color and pigmentation [Sensation] : the sensory exam was normal to light touch and pinprick [Deep Tendon Reflexes (DTR)] : deep tendon reflexes were 2+ and symmetric [] : no rash [Skin Turgor] : normal skin turgor [No Focal Deficits] : no focal deficits [Affect] : the affect was normal [Oriented To Time, Place, And Person] : oriented to person, place, and time [Impaired Insight] : insight and judgment were intact [Over the Past 2 Weeks, Have You Felt Down, Depressed, or Hopeless?] : 1.) Over the past 2 weeks, have you felt down, depressed, or hopeless? No [Over the Past 2 Weeks, Have You Felt Little Interest or Pleasure Doing Things?] : 2.) Over the past 2 weeks, have you felt little interest or pleasure doing things? No [FreeTextEntry1] : in support group and going regularly

## 2020-09-15 NOTE — REVIEW OF SYSTEMS
[Joint Pain] : joint pain [Arthralgias] : arthralgias [Joint Swelling] : no joint swelling [Joint Stiffness] : joint stiffness [Limb Pain] : limb pain [Limb Swelling] : limb swelling [As Noted in HPI] : as noted in HPI [Limb Weakness] : limb weakness [Difficulty Walking] : difficulty walking [de-identified] : bilateral knees with OA and legs weakened [Negative] : Heme/Lymph

## 2020-09-15 NOTE — SOCIAL HISTORY
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 [de-identified] : No safety concerns identified.

## 2020-09-25 ENCOUNTER — APPOINTMENT (OUTPATIENT)
Dept: ENDOCRINOLOGY | Facility: CLINIC | Age: 75
End: 2020-09-25
Payer: MEDICARE

## 2020-09-25 VITALS
TEMPERATURE: 97.9 F | SYSTOLIC BLOOD PRESSURE: 144 MMHG | HEIGHT: 64 IN | OXYGEN SATURATION: 96 % | WEIGHT: 189 LBS | HEART RATE: 84 BPM | BODY MASS INDEX: 32.27 KG/M2 | DIASTOLIC BLOOD PRESSURE: 70 MMHG

## 2020-09-25 PROCEDURE — 76536 US EXAM OF HEAD AND NECK: CPT

## 2020-09-25 PROCEDURE — 99205 OFFICE O/P NEW HI 60 MIN: CPT | Mod: 25

## 2020-09-25 NOTE — PROCEDURE
[] : a homogeneous parenchyma [Mid] : mid pole there is a  [Hypoechoic] : hypoechoic nodule [Round] : round in shape [Smooth] : smooth [No] : does not have a halo [No calcification] : no calcification [Right Thyroid] : right [Lower] : lower pole there is a  [Solid] : solid [Heterogeneous] : heterogenous nodule [Ovoid] : ovoid in shape [Regular] : regular [No calcifications] : no calcifications [Peripheral vascularity] : peripheral vascularity [2] : 2 [No abnormal lymph nodes are seen.] : no abnormal lymph nodes are seen [FreeTextEntry1] : 3.51 x 1.49 x 1.84 [FreeTextEntry5] : 3.51 x 1.06 x 1.34 [FreeTextEntry2] : 0.15 [FreeTextEntry3] : 0.93 x 0.59 x 0.76

## 2020-09-25 NOTE — REVIEW OF SYSTEMS
[Recent Weight Gain (___ Lbs)] : no recent weight gain [Recent Weight Loss (___ Lbs)] : no recent weight loss [Visual Field Defect] : no visual field defect [Dry Eyes] : no dryness [Dysphagia] : no dysphagia [Neck Pain] : no neck pain [Dysphonia] : no dysphonia [Nasal Congestion] : no nasal congestion [Chest Pain] : no chest pain [Slow Heart Rate] : heart rate is not slow [Palpitations] : no palpitations [Fast Heart Rate] : heart rate is not fast [Shortness Of Breath] : no shortness of breath [Cough] : no cough [Nausea] : no nausea [Constipation] : no constipation [Vomiting] : no vomiting [Diarrhea] : no diarrhea [Polyuria] : no polyuria [Irregular Menses] : regular menses [Joint Pain] : no joint pain [Muscle Weakness] : no muscle weakness [Acanthosis] : no acanthosis  [Acne] : no acne [Headaches] : no headaches [Dizziness] : no dizziness [Tremors] : no tremors [Depression] : no depression [Polydipsia] : no polydipsia [Cold Intolerance] : no cold intolerance [Easy Bleeding] : no ~M tendency for easy bleeding [Easy Bruising] : no tendency for easy bruising

## 2020-09-25 NOTE — ASSESSMENT
[FreeTextEntry1] : 75 year old female with a thyroid nodule on the right lobe discovered incidentally here for evaluation. \par \par -Right lower pole 0.93 cm heterogenous, slightly hypoechoic nodule\par -Currently not meeting FNA criteria \par -F/u on  TFTS \par -Follow up in 6 months with ultrasound

## 2020-09-25 NOTE — PHYSICAL EXAM
[Alert] : alert [Well Nourished] : well nourished [No Acute Distress] : no acute distress [Normal Sclera/Conjunctiva] : normal sclera/conjunctiva [PERRL] : pupils equal, round and reactive to light [Normal Outer Ear/Nose] : the ears and nose were normal in appearance [Normal Hearing] : hearing was normal [Normal TMs] : both tympanic membranes were normal [No Neck Mass] : no neck mass was observed [Thyroid Not Enlarged] : the thyroid was not enlarged [No Thyroid Nodules] : no palpable thyroid nodules [No Respiratory Distress] : no respiratory distress [Clear to Auscultation] : lungs were clear to auscultation bilaterally [Normal S1, S2] : normal S1 and S2 [Normal Rate] : heart rate was normal [Regular Rhythm] : with a regular rhythm [Normal Bowel Sounds] : normal bowel sounds [Not Tender] : non-tender [Soft] : abdomen soft [Normal Gait] : normal gait [No Clubbing, Cyanosis] : no clubbing  or cyanosis of the fingernails [No Joint Swelling] : no joint swelling seen [Normal Strength/Tone] : muscle strength and tone were normal [No Rash] : no rash [No Skin Lesions] : no skin lesions [No Motor Deficits] : the motor exam was normal [Normal Reflexes] : deep tendon reflexes were 2+ and symmetric [No Tremors] : no tremors [Oriented x3] : oriented to person, place, and time [Normal Affect] : the affect was normal [Normal Insight/Judgement] : insight and judgment were intact [Normal Mood] : the mood was normal

## 2020-09-25 NOTE — IMPRESSION
[FreeTextEntry1] : 0.93 cm heterogenous, slightly hypoechoic nodule on the right lower pole. \par Continue to observe  [FreeTextEntry2] : Follow up in 6 months

## 2020-09-25 NOTE — HISTORY OF PRESENT ILLNESS
[FreeTextEntry1] : 75 year old female with history of Multiple Sclerosis here for evaluation of a thyroid nodule\par \par Patient recently had a cervical MRI and was found to have a right sided 1.0 cm hypoechoic nodule \par No subsequent ultrasound was done\par \par No family history of thyroid cancer \par No history of head or neck radiation \par No compressive neck symptoms \par \par TFTs are pending  but TFTs in 2019 were normal

## 2020-10-28 ENCOUNTER — RX RENEWAL (OUTPATIENT)
Age: 75
End: 2020-10-28

## 2020-12-04 ENCOUNTER — RX RENEWAL (OUTPATIENT)
Age: 75
End: 2020-12-04

## 2020-12-06 ENCOUNTER — RX RENEWAL (OUTPATIENT)
Age: 75
End: 2020-12-06

## 2020-12-09 ENCOUNTER — APPOINTMENT (OUTPATIENT)
Dept: GERIATRICS | Facility: CLINIC | Age: 75
End: 2020-12-09
Payer: MEDICARE

## 2020-12-09 VITALS
RESPIRATION RATE: 14 BRPM | HEART RATE: 69 BPM | WEIGHT: 193.5 LBS | DIASTOLIC BLOOD PRESSURE: 70 MMHG | OXYGEN SATURATION: 96 % | HEIGHT: 64 IN | BODY MASS INDEX: 33.03 KG/M2 | TEMPERATURE: 97.1 F | SYSTOLIC BLOOD PRESSURE: 130 MMHG

## 2020-12-09 PROCEDURE — 99214 OFFICE O/P EST MOD 30 MIN: CPT

## 2020-12-09 RX ORDER — OXYBUTYNIN CHLORIDE 10 MG/1
10 TABLET, EXTENDED RELEASE ORAL
Refills: 0 | Status: ACTIVE | COMMUNITY
Start: 2020-12-09

## 2020-12-13 NOTE — SOCIAL HISTORY
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 [de-identified] : No safety concerns identified.

## 2020-12-13 NOTE — HISTORY OF PRESENT ILLNESS
[FreeTextEntry1] : 74 yo female here for followup. Had broken femur repaired at [facility] then went to [facility] for rehab and was covid positive and isolated. Pt says she hated the isolation.  Pt now doing well.  Living at home with daughter and grandchildren.\par \par No falls. Pain manageable. \par Pt managing in quarantine well.  \par \par Pt has some weight gain but admits she is doing less.\par \par Will repeat labs at next visit as pt clinically stable.\par \par  [0] : 2) Feeling down, depressed, or hopeless: Not at all

## 2020-12-13 NOTE — ASSESSMENT
[FreeTextEntry1] : 74 yo female with MS s/p hipr fracture and covid and now with incontinence which may be MS related. Started on ditropan by neurologist and tolerated by pt.  \par \par 1) MS with incontinence - no need for self catheterization\par 2) OA - voltaren gel, oxycodone - acetominphen, pregabalin\par 3) Neuropathy - lyrica (pregabalin) \par 4) Osteoporosis - ibandronate\par 5) HTN - metoprolol.\par 6) CHolesterol - rosuvastatin.\par 7) HM - need labs to be ordered for pt at next visit.  Labs from [facility] and [facility], stable.  \par 8) Recurrent DVT - continue eliquis.

## 2020-12-13 NOTE — ASSESSMENT
[FreeTextEntry1] : 74 yo female with MS s/p hipr fracture and covid and now with incontinence which may be MS related. Started on ditropan by neurologist and tolerated by pt.  \par \par 1) MS with incontinence - no need for self catheterization\par 2) OA - voltaren gel, oxycodone - acetominphen, pregabalin\par 3) Neuropathy - lyrica (pregabalin) \par 4) Osteoporosis - ibandronate\par 5) HTN - metoprolol.\par 6) CHolesterol - rosuvastatin.\par 7) HM - need labs to be ordered for pt at next visit.  Labs from [facility] and [facility], stable.  \par 8) Recurrent DVT - continue eliquis.

## 2020-12-13 NOTE — PHYSICAL EXAM
[Normal Oral Mucosa] : normal oral mucosa [No Oral Pallor] : no oral pallor [No Oral Cyanosis] : no oral cyanosis [Outer Ear] : the ears and nose were normal in appearance [Oropharynx] : The oropharynx was normal [General Appearance - Alert] : alert [General Appearance - In No Acute Distress] : in no acute distress [Sclera] : the sclera and conjunctiva were normal [PERRL With Normal Accommodation] : pupils were equal in size, round, and reactive to light [Extraocular Movements] : extraocular movements were intact [Neck Appearance] : the appearance of the neck was normal [Neck Cervical Mass (___cm)] : no neck mass was observed [Jugular Venous Distention Increased] : there was no jugular-venous distention [Thyroid Diffuse Enlargement] : the thyroid was not enlarged [Thyroid Nodule] : there were no palpable thyroid nodules [Auscultation Breath Sounds / Voice Sounds] : lungs were clear to auscultation bilaterally [Heart Rate And Rhythm] : heart rate was normal and rhythm regular [Heart Sounds] : normal S1 and S2 [Heart Sounds Gallop] : no gallops [Murmurs] : no murmurs [Heart Sounds Pericardial Friction Rub] : no pericardial rub [Full Pulse] : the pedal pulses are present [Breast Appearance] : normal in appearance [Breast Palpation Mass] : no palpable masses [Bowel Sounds] : normal bowel sounds [Abdomen Soft] : soft [Abdomen Tenderness] : non-tender [Abdomen Mass (___ Cm)] : no abdominal mass palpated [Cervical Lymph Nodes Enlarged Posterior Bilaterally] : posterior cervical [Cervical Lymph Nodes Enlarged Anterior Bilaterally] : anterior cervical [Supraclavicular Lymph Nodes Enlarged Bilaterally] : supraclavicular [Axillary Lymph Nodes Enlarged Bilaterally] : axillary [Femoral Lymph Nodes Enlarged Bilaterally] : femoral [Inguinal Lymph Nodes Enlarged Bilaterally] : inguinal [No CVA Tenderness] : no ~M costovertebral angle tenderness [No Spinal Tenderness] : no spinal tenderness [Abnormal Walk] : normal gait [Nail Clubbing] : no clubbing  or cyanosis of the fingernails [Musculoskeletal - Swelling] : no joint swelling seen [Motor Tone] : muscle strength and tone were normal [Skin Color & Pigmentation] : normal skin color and pigmentation [Skin Turgor] : normal skin turgor [] : no rash [Deep Tendon Reflexes (DTR)] : deep tendon reflexes were 2+ and symmetric [Sensation] : the sensory exam was normal to light touch and pinprick [No Focal Deficits] : no focal deficits [Oriented To Time, Place, And Person] : oriented to person, place, and time [Impaired Insight] : insight and judgment were intact [Affect] : the affect was normal [Over the Past 2 Weeks, Have You Felt Down, Depressed, or Hopeless?] : 1.) Over the past 2 weeks, have you felt down, depressed, or hopeless? No [Over the Past 2 Weeks, Have You Felt Little Interest or Pleasure Doing Things?] : 2.) Over the past 2 weeks, have you felt little interest or pleasure doing things? No [FreeTextEntry1] : in support group and going regularly

## 2020-12-13 NOTE — SOCIAL HISTORY
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 [de-identified] : No safety concerns identified.

## 2020-12-13 NOTE — HISTORY OF PRESENT ILLNESS
[FreeTextEntry1] : 76 yo female here for followup. Had broken femur repaired at [facility] then went to [facility] for rehab and was covid positive and isolated. Pt says she hated the isolation.  Pt now doing well.  Living at home with daughter and grandchildren.\par \par No falls. Pain manageable. \par Pt managing in quarantine well.  \par \par Pt has some weight gain but admits she is doing less.\par \par Will repeat labs at next visit as pt clinically stable.\par \par  [0] : 2) Feeling down, depressed, or hopeless: Not at all

## 2021-01-29 ENCOUNTER — RX RENEWAL (OUTPATIENT)
Age: 76
End: 2021-01-29

## 2021-02-26 ENCOUNTER — RX RENEWAL (OUTPATIENT)
Age: 76
End: 2021-02-26

## 2021-03-11 ENCOUNTER — APPOINTMENT (OUTPATIENT)
Dept: ENDOCRINOLOGY | Facility: CLINIC | Age: 76
End: 2021-03-11

## 2021-04-05 ENCOUNTER — RX RENEWAL (OUTPATIENT)
Age: 76
End: 2021-04-05

## 2021-04-12 ENCOUNTER — APPOINTMENT (OUTPATIENT)
Dept: ENDOCRINOLOGY | Facility: CLINIC | Age: 76
End: 2021-04-12
Payer: MEDICARE

## 2021-04-12 VITALS
BODY MASS INDEX: 33.29 KG/M2 | SYSTOLIC BLOOD PRESSURE: 124 MMHG | HEIGHT: 64 IN | OXYGEN SATURATION: 98 % | HEART RATE: 81 BPM | WEIGHT: 195 LBS | DIASTOLIC BLOOD PRESSURE: 74 MMHG | TEMPERATURE: 98.1 F

## 2021-04-12 PROCEDURE — 76536 US EXAM OF HEAD AND NECK: CPT

## 2021-04-12 PROCEDURE — 99213 OFFICE O/P EST LOW 20 MIN: CPT | Mod: 25

## 2021-04-12 NOTE — REASON FOR VISIT
[FreeTextEntry1] : Hypothyroidism. We reviewed proper use and compliance with levothyroxine. She will be stopping her birth control pill within the next few weeks and we may need to adjust her levothyroxine dose at that time. We discussed the increased levothyroxine requirements in pregnancy and she will call the office immediately if she becomes pregnant. I advised she separate a prenatal vitamin from her levothyroxine by at least four hours. Her weight-based dose of levothyroxine is around 108 mcg. We will check transglutaminase antibodies due to levothyroxine requirements above her expected weight-based dose.\par Continue levothyroxine 137 mcg daily pending TSH for goal 0.5-2.5 uIU/mL 
[Follow - Up] : a follow-up visit
[Follow - Up] : a follow-up visit

## 2021-04-12 NOTE — PROCEDURE
[Mover e 2008 model, 10-12 MHz frequencies] : multiple real time longitudinal and transverse images were obtained using a high resolution ultrasound with a linear transducer, Mover e 2008 model, 10-12 MHz frequencies. All measurements will be reported as longitudinal x lanette-posterior x transverse. [Thyroid Nodule] : thyroid nodule [] : a homogeneous parenchyma [Right Thyroid] : right [Lower] : lower pole there is a  [Solid] : solid [Isoechoic] : isoechoic nodule [Ovoid] : ovoid in shape [Distinct] : distinct [Indistinct] : indistinct [Thin] : has a thin halo [No calcification] : no calcification [Peripheral vascularity] : peripheral vascularity [2] : 2 [No abnormal lymph nodes are seen.] : no abnormal lymph nodes are seen [FreeTextEntry1] : 3.23 x 1.13 x 1.53 [FreeTextEntry5] : 3.82 x 1.3 x 1.38 [FreeTextEntry2] : 0.17 [FreeTextEntry3] : 0.88 x 0.58 x 0.62

## 2021-04-12 NOTE — ASSESSMENT
[FreeTextEntry1] : 76 year old female with a thyroid nodule on the right lobe discovered incidentally here for f/u\par \par -Right lower pole 0.88 cm heterogenous, slightly hypoechoic nodule\par -Currently not meeting FNA criteria \par -Follow up in one year for an ultrasound \par

## 2021-04-12 NOTE — IMPRESSION
[FreeTextEntry1] : 0.88 cm nodule on the right lower pole displaying interval stability  [FreeTextEntry2] : Will follow up thyroid ultrasound in one year

## 2021-04-12 NOTE — PROCEDURE
[Thompson SCI e 2008 model, 10-12 MHz frequencies] : multiple real time longitudinal and transverse images were obtained using a high resolution ultrasound with a linear transducer, Thompson SCI e 2008 model, 10-12 MHz frequencies. All measurements will be reported as longitudinal x lanette-posterior x transverse. [Thyroid Nodule] : thyroid nodule [] : a homogeneous parenchyma [Right Thyroid] : right [Lower] : lower pole there is a  [Solid] : solid [Isoechoic] : isoechoic nodule [Ovoid] : ovoid in shape [Distinct] : distinct [Indistinct] : indistinct [Thin] : has a thin halo [No calcification] : no calcification [Peripheral vascularity] : peripheral vascularity [2] : 2 [No abnormal lymph nodes are seen.] : no abnormal lymph nodes are seen [FreeTextEntry1] : 3.23 x 1.13 x 1.53 [FreeTextEntry5] : 3.82 x 1.3 x 1.38 [FreeTextEntry2] : 0.17 [FreeTextEntry3] : 0.88 x 0.58 x 0.62

## 2021-06-08 ENCOUNTER — APPOINTMENT (OUTPATIENT)
Dept: GERIATRICS | Facility: CLINIC | Age: 76
End: 2021-06-08
Payer: MEDICARE

## 2021-06-08 VITALS
TEMPERATURE: 98 F | RESPIRATION RATE: 16 BRPM | HEIGHT: 64.8 IN | DIASTOLIC BLOOD PRESSURE: 68 MMHG | WEIGHT: 199.5 LBS | BODY MASS INDEX: 33.24 KG/M2 | SYSTOLIC BLOOD PRESSURE: 124 MMHG | HEART RATE: 87 BPM | OXYGEN SATURATION: 97 %

## 2021-06-08 DIAGNOSIS — E04.1 NONTOXIC SINGLE THYROID NODULE: ICD-10-CM

## 2021-06-08 PROCEDURE — 99214 OFFICE O/P EST MOD 30 MIN: CPT

## 2021-06-08 RX ORDER — PREGABALIN 25 MG/1
25 CAPSULE ORAL
Qty: 180 | Refills: 3 | Status: COMPLETED | COMMUNITY
Start: 2019-11-06 | End: 2021-06-08

## 2021-06-08 RX ORDER — OXYCODONE AND ACETAMINOPHEN 5; 325 MG/1; MG/1
5-325 TABLET ORAL
Qty: 90 | Refills: 0 | Status: COMPLETED | COMMUNITY
Start: 2019-02-11 | End: 2021-06-08

## 2021-06-10 NOTE — PHYSICAL EXAM
[General Appearance - Alert] : alert [General Appearance - In No Acute Distress] : in no acute distress [Sclera] : the sclera and conjunctiva were normal [PERRL With Normal Accommodation] : pupils were equal in size, round, and reactive to light [Extraocular Movements] : extraocular movements were intact [Normal Oral Mucosa] : normal oral mucosa [No Oral Pallor] : no oral pallor [No Oral Cyanosis] : no oral cyanosis [Outer Ear] : the ears and nose were normal in appearance [Oropharynx] : The oropharynx was normal [Neck Appearance] : the appearance of the neck was normal [Neck Cervical Mass (___cm)] : no neck mass was observed [Jugular Venous Distention Increased] : there was no jugular-venous distention [Thyroid Diffuse Enlargement] : the thyroid was not enlarged [Thyroid Nodule] : there were no palpable thyroid nodules [Auscultation Breath Sounds / Voice Sounds] : lungs were clear to auscultation bilaterally [Heart Rate And Rhythm] : heart rate was normal and rhythm regular [Heart Sounds] : normal S1 and S2 [Heart Sounds Gallop] : no gallops [Murmurs] : no murmurs [Heart Sounds Pericardial Friction Rub] : no pericardial rub [Full Pulse] : the pedal pulses are present [Breast Appearance] : normal in appearance [Breast Palpation Mass] : no palpable masses [Bowel Sounds] : normal bowel sounds [Abdomen Soft] : soft [Abdomen Tenderness] : non-tender [Abdomen Mass (___ Cm)] : no abdominal mass palpated [Cervical Lymph Nodes Enlarged Posterior Bilaterally] : posterior cervical [Cervical Lymph Nodes Enlarged Anterior Bilaterally] : anterior cervical [Supraclavicular Lymph Nodes Enlarged Bilaterally] : supraclavicular [Axillary Lymph Nodes Enlarged Bilaterally] : axillary [Femoral Lymph Nodes Enlarged Bilaterally] : femoral [Inguinal Lymph Nodes Enlarged Bilaterally] : inguinal [No CVA Tenderness] : no ~M costovertebral angle tenderness [No Spinal Tenderness] : no spinal tenderness [Abnormal Walk] : normal gait [Nail Clubbing] : no clubbing  or cyanosis of the fingernails [Musculoskeletal - Swelling] : no joint swelling seen [Motor Tone] : muscle strength and tone were normal [Skin Color & Pigmentation] : normal skin color and pigmentation [Skin Turgor] : normal skin turgor [] : no rash [Deep Tendon Reflexes (DTR)] : deep tendon reflexes were 2+ and symmetric [Sensation] : the sensory exam was normal to light touch and pinprick [No Focal Deficits] : no focal deficits [Oriented To Time, Place, And Person] : oriented to person, place, and time [Impaired Insight] : insight and judgment were intact [Affect] : the affect was normal [Over the Past 2 Weeks, Have You Felt Down, Depressed, or Hopeless?] : 1.) Over the past 2 weeks, have you felt down, depressed, or hopeless? No [Over the Past 2 Weeks, Have You Felt Little Interest or Pleasure Doing Things?] : 2.) Over the past 2 weeks, have you felt little interest or pleasure doing things? No [FreeTextEntry1] : in support group and going regularly

## 2021-06-10 NOTE — HISTORY OF PRESENT ILLNESS
[FreeTextEntry1] : 77 yo female with hip fracutre and then had covid. Doing better but still has chronically, intermittent cough, spasm like. Denies feeling sob or wheezing.just has cough spasms at times and not sure what triggers it.  \par She is eating well, lives with daughter and two grandchildren.  Has 4 grandchildren\par \par Pt denies falls, ED visits, hospitalization..  Denies constipation\par \par  SHe is still coping with having had 7 weeks of isolation at rehab with hip fracture - "horrible". SHe has fatigue post covid.   [0] : 2) Feeling down, depressed, or hopeless: Not at all

## 2021-06-10 NOTE — ASSESSMENT
[FreeTextEntry1] : 77 yo female with MS , OA, s/p Hip fracture with repais. Uses a walker.  \par \par 1) Osteoporosis, fatigue - check labs \par 2) reviewed meds\par  no change\par  \par 3) htn - stab;e\par 4) OA - considering knee replacement but pt not ready to go into the hospital again\par 5) Vit D deficiency - supplementation\par 6) MS\par \par \par Return in 6 months

## 2021-06-14 ENCOUNTER — RX RENEWAL (OUTPATIENT)
Age: 76
End: 2021-06-14

## 2021-07-20 ENCOUNTER — RX RENEWAL (OUTPATIENT)
Age: 76
End: 2021-07-20

## 2021-08-04 ENCOUNTER — RX RENEWAL (OUTPATIENT)
Age: 76
End: 2021-08-04

## 2021-09-08 ENCOUNTER — RX RENEWAL (OUTPATIENT)
Age: 76
End: 2021-09-08

## 2021-11-09 ENCOUNTER — RX RENEWAL (OUTPATIENT)
Age: 76
End: 2021-11-09

## 2021-11-23 ENCOUNTER — APPOINTMENT (OUTPATIENT)
Dept: GERIATRICS | Facility: CLINIC | Age: 76
End: 2021-11-23
Payer: MEDICARE

## 2021-11-23 VITALS
SYSTOLIC BLOOD PRESSURE: 140 MMHG | WEIGHT: 197 LBS | DIASTOLIC BLOOD PRESSURE: 70 MMHG | HEART RATE: 76 BPM | RESPIRATION RATE: 16 BRPM | OXYGEN SATURATION: 98 % | HEIGHT: 64.8 IN | TEMPERATURE: 98 F | BODY MASS INDEX: 32.82 KG/M2

## 2021-11-23 PROCEDURE — 90686 IIV4 VACC NO PRSV 0.5 ML IM: CPT

## 2021-11-23 PROCEDURE — 99215 OFFICE O/P EST HI 40 MIN: CPT | Mod: 25

## 2021-11-23 PROCEDURE — G0008: CPT

## 2021-11-23 NOTE — ASSESSMENT
[FreeTextEntry1] : 77 yo, no change in meds, OA and MS\par \par FLu shot today\par Up to date with COVID\par

## 2021-12-16 ENCOUNTER — RX RENEWAL (OUTPATIENT)
Age: 76
End: 2021-12-16

## 2022-02-08 ENCOUNTER — RX RENEWAL (OUTPATIENT)
Age: 77
End: 2022-02-08

## 2022-03-16 ENCOUNTER — RX RENEWAL (OUTPATIENT)
Age: 77
End: 2022-03-16

## 2022-05-04 ENCOUNTER — RX RENEWAL (OUTPATIENT)
Age: 77
End: 2022-05-04

## 2022-05-23 ENCOUNTER — RX RENEWAL (OUTPATIENT)
Age: 77
End: 2022-05-23

## 2022-05-26 NOTE — SOCIAL HISTORY
[facility]  OPERATIVE REPORT    Name:  Cary Calix  MR#:  [id number]  :  1949  ACCOUNT #:  [de-identified]  DATE OF SERVICE:  2022    CLINICAL SERVICE:  Vascular Surgery. PREOPERATIVE DIAGNOSIS:  Endstage renal disease needing long-term dialysis access. POSTOPERATIVE DIAGNOSIS:  Endstage renal disease needing long-term dialysis access. PROCEDURE PERFORMED:  Creation of left arteriovenous thigh graft. SURGEON:  Jimi Garzon. Bob Potter MD    ASSISTANT:      ANESTHESIA:  General.    COMPLICATIONS:  None. SPECIMENS REMOVED:  None. IMPLANTS:      ESTIMATED BLOOD LOSS:      PROCEDURE IN DETAIL:  After getting informed consent, the patient was brought to the operating room and anesthesia was then induced. Preop antibiotics were given before skin incision. The patient's left thigh was then prepped and draped in normal sterile fashion. A vertical incision was made just below the inguinal crease. We dissected down through subcutaneous tissue and fascia where the SFA and the femoral vein was identified. We got control proximally and distally with vessel loops. The SFA was measured about 7 mm and the femoral vein was measured about 8 mm. We made a counter incision in the distal leg. We then tunneled subcutaneously with a Jenny-Wick tunneler, a 6 x 50 PTFE graft. We oriented the graft that the venous limb was medial and arterial limb was lateral.  We gave 6000 of heparin. We then got control of the vein with vessel loops. An 11-blade was used to do a venotomy. We then spatulated the graft and did end-to-side anastomosis using 6-0 Prolenes. He had a good backbleeding through the graft. We clamped the graft. An 11-blade was used to do an arteriotomy on the SFA. We extended it with Patiño scissors. Then we spatulated the graft and cut it and did end-to-side anastomosis using 6-0 Prolenes.     Postprocedure, the patient did have a Doppler signal in the foot and a good thrill in the graft. We held pressure for 5 minutes. We closed both wounds with 3-0 Vicryls and 4-0 Monocryl. The patient was extubated and returned to the PACU in stable condition.       Ten Raymundo MD      [id number]  D:  05/26/2022 9:10  T:  05/26/2022 13:11  JOB #:  [id number] [FreeTextEntry1] : dtr [FreeTextEntry2] : good [FreeTextEntry4] : good [No falls in past year] : Patient reported no falls in the past year [Fully functional (bathing, dressing, toileting, transferring, walking, feeding)] : Fully functional (bathing, dressing, toileting, transferring, walking, feeding) [Fully functional (using the telephone, shopping, preparing meals, housekeeping, doing laundry, using transportation,] : Fully functional and needs no help or supervision to perform IADLs (using the telephone, shopping, preparing meals, housekeeping, doing laundry, using transportation, managing medications and managing finances) [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 [de-identified] : No safety concerns identified.

## 2022-06-28 ENCOUNTER — RX RENEWAL (OUTPATIENT)
Age: 77
End: 2022-06-28

## 2022-06-28 ENCOUNTER — APPOINTMENT (OUTPATIENT)
Dept: GERIATRICS | Facility: CLINIC | Age: 77
End: 2022-06-28

## 2022-06-28 VITALS
SYSTOLIC BLOOD PRESSURE: 140 MMHG | HEART RATE: 88 BPM | OXYGEN SATURATION: 97 % | DIASTOLIC BLOOD PRESSURE: 62 MMHG | BODY MASS INDEX: 34.17 KG/M2 | TEMPERATURE: 97.2 F | HEIGHT: 64 IN | WEIGHT: 200.13 LBS | RESPIRATION RATE: 16 BRPM

## 2022-06-28 PROCEDURE — 99214 OFFICE O/P EST MOD 30 MIN: CPT

## 2022-07-01 ENCOUNTER — NON-APPOINTMENT (OUTPATIENT)
Age: 77
End: 2022-07-01

## 2022-07-01 NOTE — ASSESSMENT
[FreeTextEntry1] : 76 yo female with OA, HTN, Osteoporosis, MS\par \par 1) HIp Fracture history - on ibandronate, vit d deficient\par 2) MS - stable\par 3) DVTs - referral to new vascular doc at patietns request. Dr. Curran moving positions.\par 4) HTN - controlled\par 5) Lipids - on crestor - will follow.\par \par Labs ordered

## 2022-07-01 NOTE — HISTORY OF PRESENT ILLNESS
[FreeTextEntry1] : 78 yo female with MS, OA, HTN, DVT and Vit D deficiency. Pt doing well. No falls, lives with dtr, driving.  Had covid in 2020 but nothing since.  Eating well Functioning well MS> stable.  Uses oxycodone for kneee pain.\par \par  [No falls in past year] : Patient reported no falls in the past year [0] : 2) Feeling down, depressed, or hopeless: Not at all (0) [With Patient/Caregiver] : , with patient/caregiver [Reviewed no changes] : Reviewed, no changes [Designated Healthcare Proxy] : Designated healthcare proxy [Relationship: ___] : Relationship: [unfilled] [FreeTextEntry4] : do not have HCP in chart and willl get it to us.

## 2022-07-26 ENCOUNTER — RX RENEWAL (OUTPATIENT)
Age: 77
End: 2022-07-26

## 2022-08-01 ENCOUNTER — NON-APPOINTMENT (OUTPATIENT)
Age: 77
End: 2022-08-01

## 2022-08-08 ENCOUNTER — RX RENEWAL (OUTPATIENT)
Age: 77
End: 2022-08-08

## 2022-08-08 ENCOUNTER — NON-APPOINTMENT (OUTPATIENT)
Age: 77
End: 2022-08-08

## 2022-08-08 LAB
25(OH)D3 SERPL-MCNC: 70.1 NG/ML
ALBUMIN SERPL ELPH-MCNC: 4.2 G/DL
ALP BLD-CCNC: 81 U/L
ALT SERPL-CCNC: 13 U/L
ANION GAP SERPL CALC-SCNC: 12 MMOL/L
AST SERPL-CCNC: 18 U/L
BASOPHILS # BLD AUTO: 0.03 K/UL
BASOPHILS NFR BLD AUTO: 0.4 %
BILIRUB SERPL-MCNC: 0.3 MG/DL
BUN SERPL-MCNC: 25 MG/DL
CALCIUM SERPL-MCNC: 9.2 MG/DL
CHLORIDE SERPL-SCNC: 110 MMOL/L
CO2 SERPL-SCNC: 23 MMOL/L
CREAT SERPL-MCNC: 1.68 MG/DL
EGFR: 32 ML/MIN/1.73M2
EOSINOPHIL # BLD AUTO: 0.48 K/UL
EOSINOPHIL NFR BLD AUTO: 6.6 %
GLUCOSE SERPL-MCNC: 111 MG/DL
HCT VFR BLD CALC: 39 %
HGB BLD-MCNC: 12.7 G/DL
IMM GRANULOCYTES NFR BLD AUTO: 0.3 %
LYMPHOCYTES # BLD AUTO: 2.33 K/UL
LYMPHOCYTES NFR BLD AUTO: 32 %
MAN DIFF?: NORMAL
MCHC RBC-ENTMCNC: 30.5 PG
MCHC RBC-ENTMCNC: 32.6 GM/DL
MCV RBC AUTO: 93.8 FL
MONOCYTES # BLD AUTO: 0.83 K/UL
MONOCYTES NFR BLD AUTO: 11.4 %
NEUTROPHILS # BLD AUTO: 3.6 K/UL
NEUTROPHILS NFR BLD AUTO: 49.3 %
PLATELET # BLD AUTO: 211 K/UL
POTASSIUM SERPL-SCNC: 4.5 MMOL/L
PROT SERPL-MCNC: 6.9 G/DL
RBC # BLD: 4.16 M/UL
RBC # FLD: 15 %
SODIUM SERPL-SCNC: 144 MMOL/L
TSH SERPL-ACNC: 1.43 UIU/ML
WBC # FLD AUTO: 7.29 K/UL

## 2022-08-24 ENCOUNTER — RX RENEWAL (OUTPATIENT)
Age: 77
End: 2022-08-24

## 2022-09-19 ENCOUNTER — RX RENEWAL (OUTPATIENT)
Age: 77
End: 2022-09-19

## 2022-10-04 ENCOUNTER — RX RENEWAL (OUTPATIENT)
Age: 77
End: 2022-10-04

## 2022-11-01 NOTE — ED ADULT NURSE NOTE - SKIN TEMPERATURE MOISTURE
On Eliquis, digoxin, Toprol at home  · EKG showed uncontrolled AFib, rate 114 her prior provider  · Currently rate controlled  · digoxin level 0 7  · Continue home medications  cool/warm

## 2022-12-13 ENCOUNTER — APPOINTMENT (OUTPATIENT)
Dept: GERIATRICS | Facility: CLINIC | Age: 77
End: 2022-12-13
Payer: MEDICARE

## 2022-12-13 VITALS
HEART RATE: 76 BPM | RESPIRATION RATE: 16 BRPM | WEIGHT: 201.5 LBS | BODY MASS INDEX: 34.4 KG/M2 | DIASTOLIC BLOOD PRESSURE: 80 MMHG | HEIGHT: 64 IN | OXYGEN SATURATION: 97 % | SYSTOLIC BLOOD PRESSURE: 142 MMHG | TEMPERATURE: 98 F

## 2022-12-13 DIAGNOSIS — M79.606 PAIN IN LEG, UNSPECIFIED: ICD-10-CM

## 2022-12-13 PROCEDURE — 99214 OFFICE O/P EST MOD 30 MIN: CPT

## 2022-12-13 PROCEDURE — 90662 IIV NO PRSV INCREASED AG IM: CPT

## 2022-12-13 PROCEDURE — G0008: CPT

## 2022-12-19 NOTE — HISTORY OF PRESENT ILLNESS
[FreeTextEntry1] : 78 yo female with MS, past COVID illness. Up to date with covid vaccines but she got so sick (high fever with vaccines) that she is not sure she will take it again.\par \par She lives with dtr. Going well.  No new issues. Ambulating with walker, no fevers. Strength is good. Eating well. History of DVTs\par \par Pt mood good too.\par  [No falls in past year] : Patient reported no falls in the past year [Completely Independent] : Completely independent. [0] : 1) Little interest or pleasure doing things: Not at all (0)

## 2022-12-19 NOTE — ASSESSMENT
[FreeTextEntry1] : 78 yo female with MS\par \par 1) MS - stable, pain - gabapentin and perconcet - controlled MS nerve pain\par 2)  HTN - controlled\par 3) DVTs - life enoc eliquis, recurrent DVTs\par 4) Osteoporosis - ibandronate, Vit D\par 5) Lipids - rosuvastatin\par 6) HM - up to date immunizations\par \par Return in 4-6 months\par

## 2022-12-23 ENCOUNTER — NON-APPOINTMENT (OUTPATIENT)
Age: 77
End: 2022-12-23

## 2022-12-23 LAB
ALBUMIN SERPL ELPH-MCNC: 4.1 G/DL
ALP BLD-CCNC: 82 U/L
ALT SERPL-CCNC: 13 U/L
ANION GAP SERPL CALC-SCNC: 14 MMOL/L
AST SERPL-CCNC: 17 U/L
BILIRUB SERPL-MCNC: 0.4 MG/DL
BUN SERPL-MCNC: 27 MG/DL
CALCIUM SERPL-MCNC: 9.4 MG/DL
CHLORIDE SERPL-SCNC: 109 MMOL/L
CHOLEST SERPL-MCNC: 271 MG/DL
CO2 SERPL-SCNC: 22 MMOL/L
CREAT SERPL-MCNC: 1.71 MG/DL
EGFR: 31 ML/MIN/1.73M2
GLUCOSE SERPL-MCNC: 97 MG/DL
HDLC SERPL-MCNC: 44 MG/DL
LDLC SERPL CALC-MCNC: 169 MG/DL
NONHDLC SERPL-MCNC: 227 MG/DL
POTASSIUM SERPL-SCNC: 4.3 MMOL/L
PROT SERPL-MCNC: 6.6 G/DL
SODIUM SERPL-SCNC: 145 MMOL/L
TRIGL SERPL-MCNC: 292 MG/DL

## 2023-02-10 ENCOUNTER — APPOINTMENT (OUTPATIENT)
Dept: ENDOCRINOLOGY | Facility: CLINIC | Age: 78
End: 2023-02-10

## 2023-03-01 ENCOUNTER — RX RENEWAL (OUTPATIENT)
Age: 78
End: 2023-03-01

## 2023-04-23 ENCOUNTER — NON-APPOINTMENT (OUTPATIENT)
Age: 78
End: 2023-04-23

## 2023-04-24 ENCOUNTER — RX RENEWAL (OUTPATIENT)
Age: 78
End: 2023-04-24

## 2023-06-05 ENCOUNTER — RX RENEWAL (OUTPATIENT)
Age: 78
End: 2023-06-05

## 2023-06-22 ENCOUNTER — APPOINTMENT (OUTPATIENT)
Dept: GERIATRICS | Facility: CLINIC | Age: 78
End: 2023-06-22
Payer: MEDICARE

## 2023-06-22 VITALS
WEIGHT: 198 LBS | OXYGEN SATURATION: 98 % | HEIGHT: 64 IN | DIASTOLIC BLOOD PRESSURE: 96 MMHG | BODY MASS INDEX: 33.8 KG/M2 | RESPIRATION RATE: 16 BRPM | HEART RATE: 81 BPM | SYSTOLIC BLOOD PRESSURE: 140 MMHG

## 2023-06-22 PROCEDURE — 99214 OFFICE O/P EST MOD 30 MIN: CPT

## 2023-06-22 NOTE — ASSESSMENT
[FreeTextEntry1] : 78 yo female with MS, bilateral edema, osteoporosis, htn, hld. \par \par 1) MS - stable, pain - gabapentin and percocet - controlled MS nerve pain\par 2)  HTN - controlled  repeat /70\par 3) DVTs - life enoc eliquis, recurrent DVTs\par 4) Osteoporosis - ibandronate, Vit D\par 5) Lipids - rosuvastatin increase to 20 mg q hs.\par 6) HM - up to date immunizations\par 7) Creatinine elevated repeat today , ifi ncreased may send to nephrology for consultation\par 8) Vit D defiency\par \par \par Return in 4-6 months\par

## 2023-06-22 NOTE — HISTORY OF PRESENT ILLNESS
[No falls in past year] : Patient reported no falls in the past year [Completely Independent] : Completely independent. [0] : 2) Feeling down, depressed, or hopeless: Not at all (0) [FreeTextEntry1] : 78 yo female with MS, past COVID illness. Up to date with covid vaccines but she got so sick (high fever with vaccines) that she is not sure she will take it again. Pt seen in December 2022 and crn =1.71. Pt avoiding NSAIDs, limited voltaren.  Here for followup. Pt also had increased lipid levels.  Pt watching her diet.  Here for followup of lipids.\par \par She lives with dtr. Going well.  No new issues. Ambulating with walker, no fevers. Strength is good. Eating well. History of DVTs\par \par Pt mood good too.\par \par No new events. Pt drives, no issues, Had left hip fracutre after a fall in past andn ow doing well.Uses a walker.\par \par At last visit her cholesterol was elevated and her crn increased.  GFR = 32. [PHQ-2 Negative - No further assessment needed] : PHQ-2 Negative - No further assessment needed [I have developed a follow-up plan documented below in the note.] : I have developed a follow-up plan documented below in the note. [[id number]] : 0

## 2023-06-23 LAB
ANION GAP SERPL CALC-SCNC: 15 MMOL/L
BUN SERPL-MCNC: 27 MG/DL
CALCIUM SERPL-MCNC: 9.6 MG/DL
CHLORIDE SERPL-SCNC: 109 MMOL/L
CHOLEST SERPL-MCNC: 218 MG/DL
CHOLEST/HDLC SERPL: 4.9 RATIO
CO2 SERPL-SCNC: 21 MMOL/L
CREAT SERPL-MCNC: 1.65 MG/DL
EGFR: 32 ML/MIN/1.73M2
GLUCOSE SERPL-MCNC: 113 MG/DL
HDLC SERPL-MCNC: 44 MG/DL
LDLC SERPL CALC-MCNC: 111 MG/DL
NONHDLC SERPL-MCNC: 174 MG/DL
POTASSIUM SERPL-SCNC: 4.6 MMOL/L
SODIUM SERPL-SCNC: 145 MMOL/L
TRIGL SERPL-MCNC: 312 MG/DL

## 2023-07-11 ENCOUNTER — RX RENEWAL (OUTPATIENT)
Age: 78
End: 2023-07-11

## 2023-09-05 ENCOUNTER — RX RENEWAL (OUTPATIENT)
Age: 78
End: 2023-09-05

## 2023-09-06 ENCOUNTER — RX RENEWAL (OUTPATIENT)
Age: 78
End: 2023-09-06

## 2023-10-31 ENCOUNTER — RX RENEWAL (OUTPATIENT)
Age: 78
End: 2023-10-31

## 2023-12-05 ENCOUNTER — MED ADMIN CHARGE (OUTPATIENT)
Age: 78
End: 2023-12-05

## 2023-12-05 ENCOUNTER — APPOINTMENT (OUTPATIENT)
Dept: GERIATRICS | Facility: CLINIC | Age: 78
End: 2023-12-05
Payer: MEDICARE

## 2023-12-05 VITALS
OXYGEN SATURATION: 96 % | HEIGHT: 64 IN | DIASTOLIC BLOOD PRESSURE: 84 MMHG | RESPIRATION RATE: 18 BRPM | HEART RATE: 73 BPM | BODY MASS INDEX: 33.97 KG/M2 | SYSTOLIC BLOOD PRESSURE: 150 MMHG | WEIGHT: 199 LBS

## 2023-12-05 DIAGNOSIS — E55.9 VITAMIN D DEFICIENCY, UNSPECIFIED: ICD-10-CM

## 2023-12-05 DIAGNOSIS — E07.9 DISORDER OF THYROID, UNSPECIFIED: ICD-10-CM

## 2023-12-05 DIAGNOSIS — Z00.00 ENCOUNTER FOR GENERAL ADULT MEDICAL EXAMINATION W/OUT ABNORMAL FINDINGS: ICD-10-CM

## 2023-12-05 PROCEDURE — 99214 OFFICE O/P EST MOD 30 MIN: CPT | Mod: 25

## 2023-12-05 PROCEDURE — G0439: CPT

## 2023-12-05 PROCEDURE — G0008: CPT

## 2023-12-05 PROCEDURE — 90662 IIV NO PRSV INCREASED AG IM: CPT

## 2023-12-05 RX ORDER — METOPROLOL SUCCINATE 50 MG/1
50 TABLET, EXTENDED RELEASE ORAL
Qty: 90 | Refills: 2 | Status: ACTIVE | COMMUNITY
Start: 2017-08-11

## 2023-12-05 RX ORDER — IBANDRONATE SODIUM 150 MG/1
150 TABLET ORAL
Qty: 1 | Refills: 2 | Status: COMPLETED | COMMUNITY
Start: 2017-12-12 | End: 2023-12-05

## 2023-12-05 RX ORDER — ROSUVASTATIN CALCIUM 20 MG/1
20 TABLET, FILM COATED ORAL DAILY
Refills: 0 | Status: ACTIVE | COMMUNITY
Start: 2017-01-05

## 2023-12-06 LAB
ANION GAP SERPL CALC-SCNC: 13 MMOL/L
BUN SERPL-MCNC: 28 MG/DL
CALCIUM SERPL-MCNC: 9.3 MG/DL
CHLORIDE SERPL-SCNC: 109 MMOL/L
CHOLEST SERPL-MCNC: 196 MG/DL
CO2 SERPL-SCNC: 22 MMOL/L
CREAT SERPL-MCNC: 1.83 MG/DL
EGFR: 28 ML/MIN/1.73M2
GLUCOSE SERPL-MCNC: 85 MG/DL
HDLC SERPL-MCNC: 43 MG/DL
LDLC SERPL CALC-MCNC: 107 MG/DL
NONHDLC SERPL-MCNC: 153 MG/DL
POTASSIUM SERPL-SCNC: 4.6 MMOL/L
SODIUM SERPL-SCNC: 145 MMOL/L
TRIGL SERPL-MCNC: 271 MG/DL

## 2023-12-06 RX ORDER — APIXABAN 2.5 MG/1
2.5 TABLET, FILM COATED ORAL
Qty: 90 | Refills: 3 | Status: ACTIVE | COMMUNITY
Start: 2018-01-09 | End: 1900-01-01

## 2023-12-25 ENCOUNTER — NON-APPOINTMENT (OUTPATIENT)
Age: 78
End: 2023-12-25

## 2024-03-13 ENCOUNTER — RX RENEWAL (OUTPATIENT)
Age: 79
End: 2024-03-13

## 2024-04-16 ENCOUNTER — APPOINTMENT (OUTPATIENT)
Dept: GERIATRICS | Facility: CLINIC | Age: 79
End: 2024-04-16
Payer: MEDICARE

## 2024-04-16 VITALS
SYSTOLIC BLOOD PRESSURE: 149 MMHG | HEART RATE: 80 BPM | BODY MASS INDEX: 34.15 KG/M2 | HEIGHT: 64 IN | DIASTOLIC BLOOD PRESSURE: 80 MMHG | OXYGEN SATURATION: 96 % | WEIGHT: 200 LBS | TEMPERATURE: 98 F

## 2024-04-16 DIAGNOSIS — D47.2 MONOCLONAL GAMMOPATHY: ICD-10-CM

## 2024-04-16 DIAGNOSIS — I82.409 ACUTE EMBOLISM AND THROMBOSIS OF UNSPECIFIED DEEP VEINS OF UNSPECIFIED LOWER EXTREMITY: ICD-10-CM

## 2024-04-16 DIAGNOSIS — I10 ESSENTIAL (PRIMARY) HYPERTENSION: ICD-10-CM

## 2024-04-16 DIAGNOSIS — M81.0 AGE-RELATED OSTEOPOROSIS W/OUT CURRENT PATHOLOGICAL FRACTURE: ICD-10-CM

## 2024-04-16 DIAGNOSIS — M19.90 UNSPECIFIED OSTEOARTHRITIS, UNSPECIFIED SITE: ICD-10-CM

## 2024-04-16 DIAGNOSIS — N18.9 CHRONIC KIDNEY DISEASE, UNSPECIFIED: ICD-10-CM

## 2024-04-16 DIAGNOSIS — E78.5 HYPERLIPIDEMIA, UNSPECIFIED: ICD-10-CM

## 2024-04-16 DIAGNOSIS — G35 MULTIPLE SCLEROSIS: ICD-10-CM

## 2024-04-16 PROCEDURE — G2211 COMPLEX E/M VISIT ADD ON: CPT

## 2024-04-16 PROCEDURE — 99214 OFFICE O/P EST MOD 30 MIN: CPT

## 2024-04-17 PROBLEM — I82.409 DVT (DEEP VENOUS THROMBOSIS): Status: ACTIVE | Noted: 2018-01-09

## 2024-04-17 PROBLEM — E78.5 HYPERLIPIDEMIA: Status: ACTIVE | Noted: 2017-01-05

## 2024-04-17 PROBLEM — N18.9 CKD (CHRONIC KIDNEY DISEASE): Status: ACTIVE | Noted: 2023-06-22

## 2024-04-17 PROBLEM — M81.0 OSTEOPOROSIS: Status: ACTIVE | Noted: 2020-09-10

## 2024-04-17 PROBLEM — M19.90 OSTEOARTHRITIS: Status: ACTIVE | Noted: 2017-03-16

## 2024-04-17 NOTE — ADDENDUM
[FreeTextEntry1] : CRN 1.86 GFR=28; pt to return in 3 months. Avoid NSAIDs including voltaren. If GFR worsens will get nephrology appt. DVT prophylaxis for recurrent DVT and pt with GFR < 30. Eliquis 2.5 mg twice a day ordered to [facility].

## 2024-04-17 NOTE — ASSESSMENT
[FreeTextEntry1] : no memory issues. Pt able to recite months flu shot given cmp ordered lipid panel ordered reconciled meds - stopped bisphosphonate Apixaban to decrease to 2.5 mg bid

## 2024-04-17 NOTE — PHYSICAL EXAM
[Alert] : alert [Normal Outer Ear/Nose] : the ears and nose were normal in appearance [Normal Appearance] : the appearance of the neck was normal [Supple] : the neck was supple [No Respiratory Distress] : no respiratory distress [No Acc Muscle Use] : no accessory muscle use [Respiration, Rhythm And Depth] : normal respiratory rhythm and effort [Auscultation Breath Sounds / Voice Sounds] : lungs were clear to auscultation bilaterally [Heart Rate And Rhythm] : heart rate was normal and rhythm regular [Bowel Sounds] : normal bowel sounds [Abdomen Tenderness] : non-tender [No Spinal Tenderness] : no spinal tenderness [Abdomen Soft] : soft [Normal Color / Pigmentation] : normal skin color and pigmentation [Normal Turgor] : normal skin turgor [No Focal Deficits] : no focal deficits [Normal Affect] : the affect was normal [Normal Mood] : the mood was normal

## 2024-04-17 NOTE — HISTORY OF PRESENT ILLNESS
[FreeTextEntry1] : 80 yo female here for followup. Pt last seen in Dec 2023 and before that June 2023. Pt in past year has not fallen, no ED visits. Pt lives with dtr, son in law and grandchildren. 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 Pt denies pain. Pt had recurrent DVTs, treated and now on maintencance eliquis.  No falls, Pt uses a walker for weakness due to MS. Pt saw neurologist and no change in regimen. No weight gain. Mood is good. Pt has no memory issues  Pt had an elevated crn 1.8 and here for followup.  No n/v, prurutis or headaches Pt doing well [No falls in past year] : Patient reported no falls in the past year [Completely Independent] : Completely independent. [FreeTextEntry3] : uses a walker, stable [0] : 2) Feeling down, depressed, or hopeless: Not at all (0) [PHQ-2 Negative - No further assessment needed] : PHQ-2 Negative - No further assessment needed [[id number]] : 0 [Designated Healthcare Proxy] : Designated healthcare proxy [Relationship: ___] : Relationship: [unfilled] [FreeTextEntry4] : I will reconfirm at next visit

## 2024-04-17 NOTE — REVIEW OF SYSTEMS
[As Noted in HPI] : as noted in HPI [Confused] : no confusion [Convulsions] : no convulsions [Dizziness] : no dizziness [Fainting] : no fainting [Limb Weakness] : limb weakness [Difficulty Walking] : difficulty walking [Negative] : Heme/Lymph [de-identified] : stable due to MS, uses a walker

## 2024-04-22 ENCOUNTER — NON-APPOINTMENT (OUTPATIENT)
Age: 79
End: 2024-04-22

## 2024-04-22 LAB
24R-OH-CALCIDIOL SERPL-MCNC: 44.8 PG/ML
ALBUMIN SERPL ELPH-MCNC: 4.2 G/DL
ALP BLD-CCNC: 78 U/L
ALT SERPL-CCNC: 16 U/L
ANION GAP SERPL CALC-SCNC: 14 MMOL/L
AST SERPL-CCNC: 18 U/L
BILIRUB SERPL-MCNC: 0.3 MG/DL
BUN SERPL-MCNC: 25 MG/DL
CALCIUM SERPL-MCNC: 9.6 MG/DL
CHLORIDE SERPL-SCNC: 106 MMOL/L
CO2 SERPL-SCNC: 23 MMOL/L
CREAT SERPL-MCNC: 1.86 MG/DL
EGFR: 28 ML/MIN/1.73M2
GLUCOSE SERPL-MCNC: 116 MG/DL
POTASSIUM SERPL-SCNC: 4.3 MMOL/L
PROT SERPL-MCNC: 6.9 G/DL
SODIUM SERPL-SCNC: 143 MMOL/L
TSH SERPL-ACNC: 1.73 UIU/ML

## 2024-04-30 ENCOUNTER — RX RENEWAL (OUTPATIENT)
Age: 79
End: 2024-04-30

## 2024-05-16 ENCOUNTER — NON-APPOINTMENT (OUTPATIENT)
Age: 79
End: 2024-05-16

## 2024-06-24 ENCOUNTER — RX RENEWAL (OUTPATIENT)
Age: 79
End: 2024-06-24

## 2024-06-24 RX ORDER — PREGABALIN 25 MG/1
25 CAPSULE ORAL
Qty: 180 | Refills: 1 | Status: ACTIVE | COMMUNITY
Start: 2017-12-12 | End: 1900-01-01

## 2024-09-10 ENCOUNTER — RX RENEWAL (OUTPATIENT)
Age: 79
End: 2024-09-10

## 2024-10-08 ENCOUNTER — APPOINTMENT (OUTPATIENT)
Dept: GERIATRICS | Facility: CLINIC | Age: 79
End: 2024-10-08
Payer: MEDICARE

## 2024-10-08 ENCOUNTER — MED ADMIN CHARGE (OUTPATIENT)
Age: 79
End: 2024-10-08

## 2024-10-08 VITALS
OXYGEN SATURATION: 96 % | HEART RATE: 88 BPM | BODY MASS INDEX: 33.8 KG/M2 | RESPIRATION RATE: 18 BRPM | WEIGHT: 198 LBS | HEIGHT: 64 IN | TEMPERATURE: 97.8 F

## 2024-10-08 VITALS — SYSTOLIC BLOOD PRESSURE: 119 MMHG | DIASTOLIC BLOOD PRESSURE: 73 MMHG

## 2024-10-08 DIAGNOSIS — M79.606 PAIN IN LEG, UNSPECIFIED: ICD-10-CM

## 2024-10-08 DIAGNOSIS — I82.409 ACUTE EMBOLISM AND THROMBOSIS OF UNSPECIFIED DEEP VEINS OF UNSPECIFIED LOWER EXTREMITY: ICD-10-CM

## 2024-10-08 DIAGNOSIS — E55.9 VITAMIN D DEFICIENCY, UNSPECIFIED: ICD-10-CM

## 2024-10-08 DIAGNOSIS — E78.5 HYPERLIPIDEMIA, UNSPECIFIED: ICD-10-CM

## 2024-10-08 DIAGNOSIS — N18.9 CHRONIC KIDNEY DISEASE, UNSPECIFIED: ICD-10-CM

## 2024-10-08 DIAGNOSIS — D47.2 MONOCLONAL GAMMOPATHY: ICD-10-CM

## 2024-10-08 DIAGNOSIS — I10 ESSENTIAL (PRIMARY) HYPERTENSION: ICD-10-CM

## 2024-10-08 DIAGNOSIS — G35 MULTIPLE SCLEROSIS: ICD-10-CM

## 2024-10-08 DIAGNOSIS — M19.90 UNSPECIFIED OSTEOARTHRITIS, UNSPECIFIED SITE: ICD-10-CM

## 2024-10-08 PROCEDURE — G0008: CPT

## 2024-10-08 PROCEDURE — 99214 OFFICE O/P EST MOD 30 MIN: CPT

## 2024-10-08 PROCEDURE — G2211 COMPLEX E/M VISIT ADD ON: CPT

## 2024-10-08 PROCEDURE — 90662 IIV NO PRSV INCREASED AG IM: CPT

## 2024-10-09 LAB
ALBUMIN SERPL ELPH-MCNC: 4 G/DL
ALP BLD-CCNC: 74 U/L
ALT SERPL-CCNC: 12 U/L
ANION GAP SERPL CALC-SCNC: 15 MMOL/L
AST SERPL-CCNC: 17 U/L
BILIRUB SERPL-MCNC: 0.4 MG/DL
BUN SERPL-MCNC: 25 MG/DL
CALCIUM SERPL-MCNC: 9.1 MG/DL
CHLORIDE SERPL-SCNC: 110 MMOL/L
CO2 SERPL-SCNC: 21 MMOL/L
CREAT SERPL-MCNC: 1.83 MG/DL
EGFR: 28 ML/MIN/1.73M2
FOLATE SERPL-MCNC: >20 NG/ML
GLUCOSE SERPL-MCNC: 83 MG/DL
HCT VFR BLD CALC: 37.9 %
HGB BLD-MCNC: 12.1 G/DL
MCHC RBC-ENTMCNC: 30.4 PG
MCHC RBC-ENTMCNC: 31.9 GM/DL
MCV RBC AUTO: 95.2 FL
PLATELET # BLD AUTO: 172 K/UL
POTASSIUM SERPL-SCNC: 4.7 MMOL/L
PROT SERPL-MCNC: 6.7 G/DL
RBC # BLD: 3.98 M/UL
RBC # FLD: 14.6 %
SODIUM SERPL-SCNC: 146 MMOL/L
TSH SERPL-ACNC: 1.54 UIU/ML
VIT B12 SERPL-MCNC: 538 PG/ML
WBC # FLD AUTO: 11.15 K/UL

## 2024-12-10 ENCOUNTER — RX RENEWAL (OUTPATIENT)
Age: 79
End: 2024-12-10

## 2025-02-20 NOTE — H&P ADULT - HISTORY OF PRESENT ILLNESS
Brett/Carlene/Angelica notified of dismissal request.    73 y/o F PMH of HTN, multiple sclerosis on glatimer acetate, chronic pain on opioids, Hx of provoked DVT 30 yrs ago completed coumadin presents for acute onset LLE edema. Pt yesterday feeling some chills and woke up this AM with sudden onset left leg swelling 3x normal associated with pain with ambulation, and overlying redness. Pain feels like throbbing sensation, relieved with rest, and taking her prn percocet for relief. Hx of DVT in past after pregnancy. Denies any recent trauma, bug bite, travel history, hormone therapy. She is up to date with malignancy screening - mammogram, pap smear, colonoscopy and told wnl. She denies any complaints of unexpected weight loss, night sweats, melena, abd pain, cp, sob, decreased exercise tolerance, dysuria, diarrhea.    In the ED, noted hypertensive 178/80 HR 89 afebrile O2 98% RA RR 17  Doppler performed revealed acute LLE DVT from mid femoral vein through popliteal and calf vein.

## 2025-09-02 ENCOUNTER — RX RENEWAL (OUTPATIENT)
Age: 80
End: 2025-09-02